# Patient Record
Sex: FEMALE | Race: WHITE | NOT HISPANIC OR LATINO | Employment: OTHER | URBAN - METROPOLITAN AREA
[De-identification: names, ages, dates, MRNs, and addresses within clinical notes are randomized per-mention and may not be internally consistent; named-entity substitution may affect disease eponyms.]

---

## 2017-07-31 ENCOUNTER — RECORDS - HEALTHEAST (OUTPATIENT)
Dept: BONE DENSITY | Facility: CLINIC | Age: 67
End: 2017-07-31

## 2017-07-31 ENCOUNTER — RECORDS - HEALTHEAST (OUTPATIENT)
Dept: ADMINISTRATIVE | Facility: OTHER | Age: 67
End: 2017-07-31

## 2017-07-31 DIAGNOSIS — Z78.0 ASYMPTOMATIC MENOPAUSAL STATE: ICD-10-CM

## 2017-09-20 ENCOUNTER — RECORDS - HEALTHEAST (OUTPATIENT)
Dept: ADMINISTRATIVE | Facility: OTHER | Age: 67
End: 2017-09-20

## 2017-10-18 ENCOUNTER — RECORDS - HEALTHEAST (OUTPATIENT)
Dept: ADMINISTRATIVE | Facility: OTHER | Age: 67
End: 2017-10-18

## 2017-11-16 ENCOUNTER — RECORDS - HEALTHEAST (OUTPATIENT)
Dept: ADMINISTRATIVE | Facility: OTHER | Age: 67
End: 2017-11-16

## 2017-12-05 ENCOUNTER — RECORDS - HEALTHEAST (OUTPATIENT)
Dept: ADMINISTRATIVE | Facility: OTHER | Age: 67
End: 2017-12-05

## 2018-01-02 ENCOUNTER — OFFICE VISIT - HEALTHEAST (OUTPATIENT)
Dept: INTERNAL MEDICINE | Facility: CLINIC | Age: 68
End: 2018-01-02

## 2018-01-02 DIAGNOSIS — E78.00 HYPERCHOLESTEREMIA: ICD-10-CM

## 2018-01-02 DIAGNOSIS — Z01.818 PREOP EXAM FOR INTERNAL MEDICINE: ICD-10-CM

## 2018-01-02 LAB
ALBUMIN SERPL-MCNC: 3.7 G/DL (ref 3.5–5)
ALP SERPL-CCNC: 71 U/L (ref 45–120)
ALT SERPL W P-5'-P-CCNC: 15 U/L (ref 0–45)
ANION GAP SERPL CALCULATED.3IONS-SCNC: 11 MMOL/L (ref 5–18)
AST SERPL W P-5'-P-CCNC: 17 U/L (ref 0–40)
BILIRUB DIRECT SERPL-MCNC: 0.2 MG/DL
BILIRUB SERPL-MCNC: 0.6 MG/DL (ref 0–1)
BUN SERPL-MCNC: 16 MG/DL (ref 8–22)
CALCIUM SERPL-MCNC: 9.4 MG/DL (ref 8.5–10.5)
CHLORIDE BLD-SCNC: 102 MMOL/L (ref 98–107)
CHOLEST SERPL-MCNC: 145 MG/DL
CO2 SERPL-SCNC: 23 MMOL/L (ref 22–31)
CREAT SERPL-MCNC: 0.76 MG/DL (ref 0.6–1.1)
ERYTHROCYTE [DISTWIDTH] IN BLOOD BY AUTOMATED COUNT: 11.1 % (ref 11–14.5)
FASTING STATUS PATIENT QL REPORTED: NO
GFR SERPL CREATININE-BSD FRML MDRD: >60 ML/MIN/1.73M2
GLUCOSE BLD-MCNC: 88 MG/DL (ref 70–125)
HCT VFR BLD AUTO: 40.1 % (ref 35–47)
HDLC SERPL-MCNC: 39 MG/DL
HGB BLD-MCNC: 13.5 G/DL (ref 12–16)
LDLC SERPL CALC-MCNC: 94 MG/DL
MCH RBC QN AUTO: 30.2 PG (ref 27–34)
MCHC RBC AUTO-ENTMCNC: 33.6 G/DL (ref 32–36)
MCV RBC AUTO: 90 FL (ref 80–100)
PLATELET # BLD AUTO: 301 THOU/UL (ref 140–440)
PMV BLD AUTO: 6.1 FL (ref 7–10)
POTASSIUM BLD-SCNC: 4 MMOL/L (ref 3.5–5)
PROT SERPL-MCNC: 7.5 G/DL (ref 6–8)
RBC # BLD AUTO: 4.47 MILL/UL (ref 3.8–5.4)
SODIUM SERPL-SCNC: 136 MMOL/L (ref 136–145)
TRIGL SERPL-MCNC: 58 MG/DL
WBC: 5.3 THOU/UL (ref 4–11)

## 2018-01-03 LAB
ATRIAL RATE - MUSE: 96 BPM
DIASTOLIC BLOOD PRESSURE - MUSE: NORMAL MMHG
INTERPRETATION ECG - MUSE: NORMAL
P AXIS - MUSE: 45 DEGREES
PR INTERVAL - MUSE: 154 MS
QRS DURATION - MUSE: 70 MS
QT - MUSE: 360 MS
QTC - MUSE: 454 MS
R AXIS - MUSE: -3 DEGREES
SYSTOLIC BLOOD PRESSURE - MUSE: NORMAL MMHG
T AXIS - MUSE: 61 DEGREES
VENTRICULAR RATE- MUSE: 96 BPM

## 2018-01-04 ASSESSMENT — MIFFLIN-ST. JEOR: SCORE: 1274.32

## 2018-01-08 ENCOUNTER — COMMUNICATION - HEALTHEAST (OUTPATIENT)
Dept: INTERNAL MEDICINE | Facility: CLINIC | Age: 68
End: 2018-01-08

## 2018-01-09 ASSESSMENT — MIFFLIN-ST. JEOR: SCORE: 1260.71

## 2018-01-12 ENCOUNTER — SURGERY - HEALTHEAST (OUTPATIENT)
Dept: SURGERY | Facility: CLINIC | Age: 68
End: 2018-01-12

## 2018-01-12 ENCOUNTER — ANESTHESIA - HEALTHEAST (OUTPATIENT)
Dept: SURGERY | Facility: CLINIC | Age: 68
End: 2018-01-12

## 2018-01-12 ASSESSMENT — MIFFLIN-ST. JEOR: SCORE: 1260.71

## 2018-01-17 ENCOUNTER — OFFICE VISIT - HEALTHEAST (OUTPATIENT)
Dept: PHYSICAL THERAPY | Facility: REHABILITATION | Age: 68
End: 2018-01-17

## 2018-01-17 DIAGNOSIS — R26.9 GAIT ABNORMALITY: ICD-10-CM

## 2018-01-17 DIAGNOSIS — M25.551 RIGHT HIP PAIN: ICD-10-CM

## 2018-01-17 DIAGNOSIS — R29.898 WEAKNESS OF RIGHT LOWER EXTREMITY: ICD-10-CM

## 2018-01-22 ENCOUNTER — OFFICE VISIT - HEALTHEAST (OUTPATIENT)
Dept: PHYSICAL THERAPY | Facility: REHABILITATION | Age: 68
End: 2018-01-22

## 2018-01-22 DIAGNOSIS — M25.551 RIGHT HIP PAIN: ICD-10-CM

## 2018-01-22 DIAGNOSIS — R26.9 GAIT ABNORMALITY: ICD-10-CM

## 2018-01-22 DIAGNOSIS — R29.898 WEAKNESS OF RIGHT LOWER EXTREMITY: ICD-10-CM

## 2018-01-24 ENCOUNTER — COMMUNICATION - HEALTHEAST (OUTPATIENT)
Dept: INTERNAL MEDICINE | Facility: CLINIC | Age: 68
End: 2018-01-24

## 2018-01-24 DIAGNOSIS — E78.00 HYPERCHOLESTEREMIA: ICD-10-CM

## 2018-01-25 ENCOUNTER — OFFICE VISIT - HEALTHEAST (OUTPATIENT)
Dept: PHYSICAL THERAPY | Facility: REHABILITATION | Age: 68
End: 2018-01-25

## 2018-01-25 DIAGNOSIS — M25.551 RIGHT HIP PAIN: ICD-10-CM

## 2018-01-25 DIAGNOSIS — R29.898 WEAKNESS OF RIGHT LOWER EXTREMITY: ICD-10-CM

## 2018-01-25 DIAGNOSIS — R26.9 GAIT ABNORMALITY: ICD-10-CM

## 2018-01-30 ENCOUNTER — RECORDS - HEALTHEAST (OUTPATIENT)
Dept: ADMINISTRATIVE | Facility: OTHER | Age: 68
End: 2018-01-30

## 2018-01-31 ENCOUNTER — OFFICE VISIT - HEALTHEAST (OUTPATIENT)
Dept: PHYSICAL THERAPY | Facility: REHABILITATION | Age: 68
End: 2018-01-31

## 2018-01-31 DIAGNOSIS — R29.898 WEAKNESS OF RIGHT LOWER EXTREMITY: ICD-10-CM

## 2018-01-31 DIAGNOSIS — M25.551 RIGHT HIP PAIN: ICD-10-CM

## 2018-01-31 DIAGNOSIS — R26.9 GAIT ABNORMALITY: ICD-10-CM

## 2018-02-20 ENCOUNTER — OFFICE VISIT - HEALTHEAST (OUTPATIENT)
Dept: PHYSICAL THERAPY | Facility: REHABILITATION | Age: 68
End: 2018-02-20

## 2018-02-20 DIAGNOSIS — R29.898 WEAKNESS OF RIGHT LOWER EXTREMITY: ICD-10-CM

## 2018-02-20 DIAGNOSIS — R26.9 GAIT ABNORMALITY: ICD-10-CM

## 2018-02-20 DIAGNOSIS — M25.551 RIGHT HIP PAIN: ICD-10-CM

## 2018-03-08 ENCOUNTER — RECORDS - HEALTHEAST (OUTPATIENT)
Dept: ADMINISTRATIVE | Facility: OTHER | Age: 68
End: 2018-03-08

## 2018-03-19 ENCOUNTER — RECORDS - HEALTHEAST (OUTPATIENT)
Dept: ADMINISTRATIVE | Facility: OTHER | Age: 68
End: 2018-03-19

## 2018-04-18 ENCOUNTER — HOSPITAL ENCOUNTER (OUTPATIENT)
Dept: MAMMOGRAPHY | Facility: CLINIC | Age: 68
Discharge: HOME | End: 2018-04-18
Attending: INTERNAL MEDICINE

## 2018-04-18 DIAGNOSIS — Z12.31 VISIT FOR SCREENING MAMMOGRAM: ICD-10-CM

## 2018-04-24 ENCOUNTER — HOSPITAL ENCOUNTER (OUTPATIENT)
Dept: MAMMOGRAPHY | Facility: CLINIC | Age: 68
Discharge: HOME | End: 2018-04-24
Attending: INTERNAL MEDICINE

## 2018-04-24 DIAGNOSIS — N64.89 BREAST ASYMMETRY: ICD-10-CM

## 2018-04-30 ENCOUNTER — COMMUNICATION - HEALTHEAST (OUTPATIENT)
Dept: INTERNAL MEDICINE | Facility: CLINIC | Age: 68
End: 2018-04-30

## 2018-06-13 ENCOUNTER — RECORDS - HEALTHEAST (OUTPATIENT)
Dept: ADMINISTRATIVE | Facility: OTHER | Age: 68
End: 2018-06-13

## 2019-02-12 ENCOUNTER — OFFICE VISIT - HEALTHEAST (OUTPATIENT)
Dept: FAMILY MEDICINE | Facility: CLINIC | Age: 69
End: 2019-02-12

## 2019-02-12 ENCOUNTER — HOSPITAL ENCOUNTER (OUTPATIENT)
Dept: LAB | Age: 69
Setting detail: SPECIMEN
Discharge: HOME | End: 2019-02-12

## 2019-02-12 DIAGNOSIS — N89.8 VAGINAL DISCHARGE: ICD-10-CM

## 2019-02-12 DIAGNOSIS — Z00.00 ROUTINE GENERAL MEDICAL EXAMINATION AT A HEALTH CARE FACILITY: ICD-10-CM

## 2019-02-12 LAB
ALBUMIN SERPL-MCNC: 4.2 G/DL (ref 3.5–5)
ALP SERPL-CCNC: 63 U/L (ref 45–120)
ALT SERPL W P-5'-P-CCNC: 16 U/L (ref 0–45)
ANION GAP SERPL CALCULATED.3IONS-SCNC: 11 MMOL/L (ref 5–18)
AST SERPL W P-5'-P-CCNC: 19 U/L (ref 0–40)
BILIRUB SERPL-MCNC: 0.7 MG/DL (ref 0–1)
BUN SERPL-MCNC: 11 MG/DL (ref 8–22)
CALCIUM SERPL-MCNC: 9.9 MG/DL (ref 8.5–10.5)
CHLORIDE BLD-SCNC: 107 MMOL/L (ref 98–107)
CHOLEST SERPL-MCNC: 167 MG/DL
CO2 SERPL-SCNC: 24 MMOL/L (ref 22–31)
CREAT SERPL-MCNC: 0.79 MG/DL (ref 0.6–1.1)
FASTING STATUS PATIENT QL REPORTED: YES
GFR SERPL CREATININE-BSD FRML MDRD: >60 ML/MIN/1.73M2
GLUCOSE BLD-MCNC: 97 MG/DL (ref 70–125)
HDLC SERPL-MCNC: 48 MG/DL
LDLC SERPL CALC-MCNC: 103 MG/DL
POTASSIUM BLD-SCNC: 4.2 MMOL/L (ref 3.5–5)
PROT SERPL-MCNC: 6.9 G/DL (ref 6–8)
SODIUM SERPL-SCNC: 142 MMOL/L (ref 136–145)
TRIGL SERPL-MCNC: 79 MG/DL

## 2019-02-12 ASSESSMENT — MIFFLIN-ST. JEOR: SCORE: 1284.52

## 2019-02-13 ENCOUNTER — COMMUNICATION - HEALTHEAST (OUTPATIENT)
Dept: FAMILY MEDICINE | Facility: CLINIC | Age: 69
End: 2019-02-13

## 2019-02-13 DIAGNOSIS — N89.8 VAGINAL DISCHARGE: ICD-10-CM

## 2019-02-13 DIAGNOSIS — Z85.42 HISTORY OF UTERINE CANCER: ICD-10-CM

## 2019-02-14 ENCOUNTER — RECORDS - HEALTHEAST (OUTPATIENT)
Dept: ADMINISTRATIVE | Facility: OTHER | Age: 69
End: 2019-02-14

## 2019-02-28 ENCOUNTER — COMMUNICATION - HEALTHEAST (OUTPATIENT)
Dept: FAMILY MEDICINE | Facility: CLINIC | Age: 69
End: 2019-02-28

## 2019-03-11 ENCOUNTER — OFFICE VISIT - HEALTHEAST (OUTPATIENT)
Dept: FAMILY MEDICINE | Facility: CLINIC | Age: 69
End: 2019-03-11

## 2019-03-11 DIAGNOSIS — R45.82 WORRIES: ICD-10-CM

## 2019-03-11 DIAGNOSIS — R03.0 ELEVATED BLOOD PRESSURE READING WITHOUT DIAGNOSIS OF HYPERTENSION: ICD-10-CM

## 2019-03-11 DIAGNOSIS — R42 DIZZINESS: ICD-10-CM

## 2019-03-11 LAB
BASOPHILS # BLD AUTO: 0 THOU/UL (ref 0–0.2)
BASOPHILS NFR BLD AUTO: 1 % (ref 0–2)
EOSINOPHIL # BLD AUTO: 0.2 THOU/UL (ref 0–0.4)
EOSINOPHIL NFR BLD AUTO: 3 % (ref 0–6)
ERYTHROCYTE [DISTWIDTH] IN BLOOD BY AUTOMATED COUNT: 11.8 % (ref 11–14.5)
HCT VFR BLD AUTO: 42.4 % (ref 35–47)
HGB BLD-MCNC: 14.3 G/DL (ref 12–16)
LYMPHOCYTES # BLD AUTO: 1.7 THOU/UL (ref 0.8–4.4)
LYMPHOCYTES NFR BLD AUTO: 31 % (ref 20–40)
MCH RBC QN AUTO: 33 PG (ref 27–34)
MCHC RBC AUTO-ENTMCNC: 33.8 G/DL (ref 32–36)
MCV RBC AUTO: 98 FL (ref 80–100)
MONOCYTES # BLD AUTO: 0.5 THOU/UL (ref 0–0.9)
MONOCYTES NFR BLD AUTO: 10 % (ref 2–10)
NEUTROPHILS # BLD AUTO: 3 THOU/UL (ref 2–7.7)
NEUTROPHILS NFR BLD AUTO: 56 % (ref 50–70)
PLATELET # BLD AUTO: 240 THOU/UL (ref 140–440)
PMV BLD AUTO: 7.2 FL (ref 7–10)
RBC # BLD AUTO: 4.34 MILL/UL (ref 3.8–5.4)
TSH SERPL DL<=0.005 MIU/L-ACNC: 1.7 UIU/ML (ref 0.3–5)
WBC: 5.4 THOU/UL (ref 4–11)

## 2019-04-27 ENCOUNTER — COMMUNICATION - HEALTHEAST (OUTPATIENT)
Dept: FAMILY MEDICINE | Facility: CLINIC | Age: 69
End: 2019-04-27

## 2019-04-27 ENCOUNTER — COMMUNICATION - HEALTHEAST (OUTPATIENT)
Dept: INTERNAL MEDICINE | Facility: CLINIC | Age: 69
End: 2019-04-27

## 2019-04-27 DIAGNOSIS — E78.00 HYPERCHOLESTEREMIA: ICD-10-CM

## 2019-05-01 ENCOUNTER — HOSPITAL ENCOUNTER (OUTPATIENT)
Dept: MAMMOGRAPHY | Facility: CLINIC | Age: 69
Discharge: HOME | End: 2019-05-01

## 2019-05-01 DIAGNOSIS — Z12.31 VISIT FOR SCREENING MAMMOGRAM: ICD-10-CM

## 2020-02-10 ENCOUNTER — OFFICE VISIT - HEALTHEAST (OUTPATIENT)
Dept: AUDIOLOGY | Facility: CLINIC | Age: 70
End: 2020-02-10

## 2020-02-10 ENCOUNTER — OFFICE VISIT - HEALTHEAST (OUTPATIENT)
Dept: OTOLARYNGOLOGY | Facility: CLINIC | Age: 70
End: 2020-02-10

## 2020-02-10 DIAGNOSIS — H90.42 SENSORINEURAL HEARING LOSS (SNHL) OF LEFT EAR WITH UNRESTRICTED HEARING OF RIGHT EAR: ICD-10-CM

## 2020-02-10 DIAGNOSIS — H81.10 BENIGN PAROXYSMAL POSITIONAL VERTIGO, UNSPECIFIED LATERALITY: ICD-10-CM

## 2020-02-10 DIAGNOSIS — H93.12 TINNITUS, LEFT: ICD-10-CM

## 2020-02-26 ENCOUNTER — OFFICE VISIT - HEALTHEAST (OUTPATIENT)
Dept: OCCUPATIONAL THERAPY | Facility: REHABILITATION | Age: 70
End: 2020-02-26

## 2020-02-26 DIAGNOSIS — R26.81 UNSTEADINESS ON FEET: ICD-10-CM

## 2020-02-26 DIAGNOSIS — H81.12 BENIGN PAROXYSMAL POSITIONAL VERTIGO OF LEFT EAR: ICD-10-CM

## 2020-02-26 DIAGNOSIS — Z78.9 DECREASED ACTIVITIES OF DAILY LIVING (ADL): ICD-10-CM

## 2020-03-11 ENCOUNTER — COMMUNICATION - HEALTHEAST (OUTPATIENT)
Dept: FAMILY MEDICINE | Facility: CLINIC | Age: 70
End: 2020-03-11

## 2020-03-11 DIAGNOSIS — E78.00 HYPERCHOLESTEREMIA: ICD-10-CM

## 2020-10-20 ENCOUNTER — AMBULATORY - HEALTHEAST (OUTPATIENT)
Dept: PULMONOLOGY | Facility: OTHER | Age: 70
End: 2020-10-20

## 2020-10-20 DIAGNOSIS — Z23 ENCOUNTER FOR IMMUNIZATION: ICD-10-CM

## 2021-02-12 ENCOUNTER — COMMUNICATION - HEALTHEAST (OUTPATIENT)
Dept: SCHEDULING | Facility: CLINIC | Age: 71
End: 2021-02-12

## 2021-02-14 ENCOUNTER — COMMUNICATION - HEALTHEAST (OUTPATIENT)
Dept: FAMILY MEDICINE | Facility: CLINIC | Age: 71
End: 2021-02-14

## 2021-02-14 DIAGNOSIS — E78.00 HYPERCHOLESTEREMIA: ICD-10-CM

## 2021-03-02 ENCOUNTER — OFFICE VISIT - HEALTHEAST (OUTPATIENT)
Dept: FAMILY MEDICINE | Facility: CLINIC | Age: 71
End: 2021-03-02

## 2021-03-02 DIAGNOSIS — Z78.0 POSTMENOPAUSAL STATUS: ICD-10-CM

## 2021-03-02 DIAGNOSIS — H90.42 SENSORINEURAL HEARING LOSS (SNHL) OF LEFT EAR WITH UNRESTRICTED HEARING OF RIGHT EAR: ICD-10-CM

## 2021-03-02 DIAGNOSIS — H93.12 TINNITUS, LEFT: ICD-10-CM

## 2021-03-02 DIAGNOSIS — E78.00 HYPERCHOLESTEREMIA: ICD-10-CM

## 2021-03-02 DIAGNOSIS — Z00.01 ENCOUNTER FOR GENERAL ADULT MEDICAL EXAMINATION WITH ABNORMAL FINDINGS: ICD-10-CM

## 2021-03-02 LAB
ALBUMIN SERPL-MCNC: 4.3 G/DL (ref 3.5–5)
ALP SERPL-CCNC: 63 U/L (ref 45–120)
ALT SERPL W P-5'-P-CCNC: 14 U/L (ref 0–45)
ANION GAP SERPL CALCULATED.3IONS-SCNC: 10 MMOL/L (ref 5–18)
AST SERPL W P-5'-P-CCNC: 19 U/L (ref 0–40)
BASOPHILS # BLD AUTO: 0.1 THOU/UL (ref 0–0.2)
BASOPHILS NFR BLD AUTO: 1 % (ref 0–2)
BILIRUB SERPL-MCNC: 0.7 MG/DL (ref 0–1)
BUN SERPL-MCNC: 12 MG/DL (ref 8–22)
CALCIUM SERPL-MCNC: 9.1 MG/DL (ref 8.5–10.5)
CHLORIDE BLD-SCNC: 107 MMOL/L (ref 98–107)
CHOLEST SERPL-MCNC: 143 MG/DL
CO2 SERPL-SCNC: 25 MMOL/L (ref 22–31)
CREAT SERPL-MCNC: 0.77 MG/DL (ref 0.6–1.1)
EOSINOPHIL # BLD AUTO: 0.3 THOU/UL (ref 0–0.4)
EOSINOPHIL NFR BLD AUTO: 7 % (ref 0–6)
ERYTHROCYTE [DISTWIDTH] IN BLOOD BY AUTOMATED COUNT: 12.1 % (ref 11–14.5)
FASTING STATUS PATIENT QL REPORTED: YES
GFR SERPL CREATININE-BSD FRML MDRD: >60 ML/MIN/1.73M2
GLUCOSE BLD-MCNC: 94 MG/DL (ref 70–125)
HCT VFR BLD AUTO: 39.4 % (ref 35–47)
HDLC SERPL-MCNC: 45 MG/DL
HGB BLD-MCNC: 13.5 G/DL (ref 12–16)
IMM GRANULOCYTES # BLD: 0 THOU/UL
IMM GRANULOCYTES NFR BLD: 0 %
LDLC SERPL CALC-MCNC: 87 MG/DL
LYMPHOCYTES # BLD AUTO: 1 THOU/UL (ref 0.8–4.4)
LYMPHOCYTES NFR BLD AUTO: 23 % (ref 20–40)
MCH RBC QN AUTO: 32.6 PG (ref 27–34)
MCHC RBC AUTO-ENTMCNC: 34.3 G/DL (ref 32–36)
MCV RBC AUTO: 95 FL (ref 80–100)
MONOCYTES # BLD AUTO: 0.5 THOU/UL (ref 0–0.9)
MONOCYTES NFR BLD AUTO: 11 % (ref 2–10)
NEUTROPHILS # BLD AUTO: 2.6 THOU/UL (ref 2–7.7)
NEUTROPHILS NFR BLD AUTO: 58 % (ref 50–70)
PLATELET # BLD AUTO: 223 THOU/UL (ref 140–440)
PMV BLD AUTO: 9.1 FL (ref 7–10)
POTASSIUM BLD-SCNC: 3.9 MMOL/L (ref 3.5–5)
PROT SERPL-MCNC: 6.7 G/DL (ref 6–8)
RBC # BLD AUTO: 4.14 MILL/UL (ref 3.8–5.4)
SODIUM SERPL-SCNC: 142 MMOL/L (ref 136–145)
TRIGL SERPL-MCNC: 54 MG/DL
WBC: 4.5 THOU/UL (ref 4–11)

## 2021-03-02 RX ORDER — ATORVASTATIN CALCIUM 10 MG/1
TABLET, FILM COATED ORAL
Qty: 90 TABLET | Refills: 3 | Status: SHIPPED | OUTPATIENT
Start: 2021-03-02 | End: 2022-03-21

## 2021-03-02 ASSESSMENT — ANXIETY QUESTIONNAIRES
5. BEING SO RESTLESS THAT IT IS HARD TO SIT STILL: NOT AT ALL
IF YOU CHECKED OFF ANY PROBLEMS ON THIS QUESTIONNAIRE, HOW DIFFICULT HAVE THESE PROBLEMS MADE IT FOR YOU TO DO YOUR WORK, TAKE CARE OF THINGS AT HOME, OR GET ALONG WITH OTHER PEOPLE: NOT DIFFICULT AT ALL
1. FEELING NERVOUS, ANXIOUS, OR ON EDGE: SEVERAL DAYS
2. NOT BEING ABLE TO STOP OR CONTROL WORRYING: SEVERAL DAYS
4. TROUBLE RELAXING: SEVERAL DAYS
GAD7 TOTAL SCORE: 5
6. BECOMING EASILY ANNOYED OR IRRITABLE: SEVERAL DAYS
7. FEELING AFRAID AS IF SOMETHING AWFUL MIGHT HAPPEN: NOT AT ALL
3. WORRYING TOO MUCH ABOUT DIFFERENT THINGS: SEVERAL DAYS

## 2021-03-02 ASSESSMENT — PATIENT HEALTH QUESTIONNAIRE - PHQ9: SUM OF ALL RESPONSES TO PHQ QUESTIONS 1-9: 0

## 2021-03-02 ASSESSMENT — MIFFLIN-ST. JEOR: SCORE: 1235.53

## 2021-03-03 LAB — HCV AB SERPL QL IA: NEGATIVE

## 2021-03-12 ENCOUNTER — HOSPITAL ENCOUNTER (OUTPATIENT)
Dept: MAMMOGRAPHY | Facility: CLINIC | Age: 71
Discharge: HOME | End: 2021-03-12

## 2021-03-12 DIAGNOSIS — Z12.31 SCREENING MAMMOGRAM, ENCOUNTER FOR: ICD-10-CM

## 2021-04-06 ENCOUNTER — COMMUNICATION - HEALTHEAST (OUTPATIENT)
Dept: FAMILY MEDICINE | Facility: CLINIC | Age: 71
End: 2021-04-06

## 2021-04-06 DIAGNOSIS — R42 VERTIGO: ICD-10-CM

## 2021-04-13 ENCOUNTER — OFFICE VISIT - HEALTHEAST (OUTPATIENT)
Dept: OCCUPATIONAL THERAPY | Facility: REHABILITATION | Age: 71
End: 2021-04-13

## 2021-04-13 DIAGNOSIS — R26.81 UNSTEADINESS ON FEET: ICD-10-CM

## 2021-04-13 DIAGNOSIS — Z78.9 DECREASED ACTIVITIES OF DAILY LIVING (ADL): ICD-10-CM

## 2021-04-13 DIAGNOSIS — H81.11 BENIGN PAROXYSMAL POSITIONAL VERTIGO, RIGHT: ICD-10-CM

## 2021-04-21 ENCOUNTER — OFFICE VISIT - HEALTHEAST (OUTPATIENT)
Dept: OCCUPATIONAL THERAPY | Facility: REHABILITATION | Age: 71
End: 2021-04-21

## 2021-04-21 DIAGNOSIS — H81.11 BENIGN PAROXYSMAL POSITIONAL VERTIGO, RIGHT: ICD-10-CM

## 2021-05-01 ENCOUNTER — HEALTH MAINTENANCE LETTER (OUTPATIENT)
Age: 71
End: 2021-05-01

## 2021-05-20 ENCOUNTER — RECORDS - HEALTHEAST (OUTPATIENT)
Dept: ADMINISTRATIVE | Facility: OTHER | Age: 71
End: 2021-05-20

## 2021-05-20 ENCOUNTER — RECORDS - HEALTHEAST (OUTPATIENT)
Dept: FAMILY MEDICINE | Facility: CLINIC | Age: 71
End: 2021-05-20

## 2021-05-20 DIAGNOSIS — R42 VERTIGO: ICD-10-CM

## 2021-05-24 ENCOUNTER — OFFICE VISIT - HEALTHEAST (OUTPATIENT)
Dept: OCCUPATIONAL THERAPY | Facility: REHABILITATION | Age: 71
End: 2021-05-24

## 2021-05-24 DIAGNOSIS — R26.81 UNSTEADINESS ON FEET: ICD-10-CM

## 2021-05-24 DIAGNOSIS — Z78.9 DECREASED ACTIVITIES OF DAILY LIVING (ADL): ICD-10-CM

## 2021-05-24 DIAGNOSIS — H81.12 BENIGN PAROXYSMAL POSITIONAL VERTIGO OF LEFT EAR: ICD-10-CM

## 2021-05-24 DIAGNOSIS — H81.11 BENIGN PAROXYSMAL POSITIONAL VERTIGO, RIGHT: ICD-10-CM

## 2021-05-24 DIAGNOSIS — R42 DIZZINESS: ICD-10-CM

## 2021-05-27 ASSESSMENT — PATIENT HEALTH QUESTIONNAIRE - PHQ9: SUM OF ALL RESPONSES TO PHQ QUESTIONS 1-9: 0

## 2021-05-28 ENCOUNTER — RECORDS - HEALTHEAST (OUTPATIENT)
Dept: ADMINISTRATIVE | Facility: CLINIC | Age: 71
End: 2021-05-28

## 2021-05-28 ASSESSMENT — ANXIETY QUESTIONNAIRES: GAD7 TOTAL SCORE: 5

## 2021-05-28 NOTE — TELEPHONE ENCOUNTER
Refill Approved    Rx renewed per Medication Renewal Policy. Medication was last renewed on 1/27/18, last OV 3/11/19.    Orquidea Kramer, [facility] Triage/Med Refill 4/27/2019     Requested Prescriptions   Pending Prescriptions Disp Refills     atorvastatin (LIPITOR) 10 MG tablet [Pharmacy Med Name: ATORVASTATIN 10MG TABLETS] 90 tablet 0     Sig: TAKE 1 TABLET(10 MG) BY MOUTH AT BEDTIME       Statins Refill Protocol (Hmg CoA Reductase Inhibitors) Passed - 4/27/2019  8:46 AM        Passed - PCP or prescribing provider visit in past 12 months      Last office visit with prescriber/PCP: Visit date not found OR same dept: Visit date not found OR same specialty: Visit date not found  Last physical: 1/2/2018 Last MTM visit: Visit date not found   Next visit within 3 mo: Visit date not found  Next physical within 3 mo: Visit date not found  Prescriber OR PCP: Zoila Olea MD  Last diagnosis associated with med order: There are no diagnoses linked to this encounter.  If protocol passes may refill for 12 months if within 3 months of last provider visit (or a total of 15 months).

## 2021-05-31 VITALS — WEIGHT: 165.8 LBS | BODY MASS INDEX: 27.59 KG/M2

## 2021-05-31 VITALS — BODY MASS INDEX: 26.99 KG/M2 | WEIGHT: 162 LBS | HEIGHT: 65 IN

## 2021-06-02 VITALS — HEIGHT: 65 IN | WEIGHT: 169 LBS | BODY MASS INDEX: 28.16 KG/M2

## 2021-06-02 VITALS — WEIGHT: 169.6 LBS | BODY MASS INDEX: 28.66 KG/M2

## 2021-06-05 VITALS
HEART RATE: 71 BPM | DIASTOLIC BLOOD PRESSURE: 67 MMHG | BODY MASS INDEX: 26.36 KG/M2 | WEIGHT: 158.2 LBS | SYSTOLIC BLOOD PRESSURE: 132 MMHG | OXYGEN SATURATION: 99 % | HEIGHT: 65 IN

## 2021-06-05 NOTE — PROGRESS NOTES
HISTORY OF PRESENT ILLNESS  Asked to see by Dr. Bhandari for evaluation of dizziness. Patient reports that she is experiencing some white noise over the last few months. Had a physical last February and didn't notice it then. The noise is primarily in the left ear. The dizzines occurs with lifting hands over head. She feels like she spins. If she is in her bed and turns to the left she gets dizzy. When she gets up she can be a little woozy. She functions normally during the day. No pain. No hearing fluctuation. No fullness or pressure in the ear. No history of significant noise exposure. She reports that she was having the dizziness last year and it disappeared. It seem to have come back in the Winter.     REVIEW OF SYSTEMS  Review of Systems: a 10-system review was performed. Pertinent positives are noted in the HPI and on a separate scanned document in the chart.    PMH, PSH, FH and SH has documented in the EHR.      EXAM    CONSTITUTIONAL  General Appearance:  Normal, well developed, well nourished, no obvious distress  Ability to Communicate:  communicates appropriately.    HEAD AND FACE  Appearance and Symmetry:  Normal, no scalp or facial scarring or suspicious lesions.  Paranasal sinuses tenderness:  Normal, Paranasal sinuses non tender    EARS  Clinical speech reception threshold:  Normal, able to hear normal speech.  Auricle:  Normal, Auricles without scars, lesions, masses.  External auditory canal:  Normal, External auditory canal normal.  Tympanic membrane:  Normal, Tympanic membranes normal without swelling or erythema.  Tympanic membrane mobility:  Normal, Normal tympanic membrane mobility.    NOSE (speculum or scope)  Architecture:  Normal, Grossly normal external nasal architecture with no masses or lesions.  Mucosa:  Normal mucosa, No polyps or masses.  Septum:  Normal, Septum non-obstructing.  Turbinates:  Normal, No turbinate abnormalities    ORAL CAVITY AND OROPHARYNX  Lips:  Normal.  Dental and  gingiva:  Normal, No obvious dental or gingival disease.  Mucosa:  Normal, Moist mucous membranes.  Tongue:  Normal, Tongue mobile with no mucosal abnormalities  Hard and soft palate:  Normal, Hard and soft palate without cleft or mucosal lesions.  Oral pharynx:  Normal, Posterior pharynx without lesions or remarkable asymmetry.  Saliva:  Normal, Clear saliva.  Masses:  Normal, No palpable masses or pathologically enlarged lymph nodes.    NECK  Masses/lymph nodes:  Normal, No worrisome neck masses or lymph nodes.  Salivary glands:  Normal, Parotid and submandibular glands.  Trachea and larynx position:  Normal, Trachea and larynx midline.  Thyroid:  Normal, No thyroid abnormality.  Tenderness:  Normal, No cervical tenderness.  Suppleness:  Normal, Neck supple    NEUROLOGICAL  Speech pattern:  Normal, Proasaic    RESPIRATORY  Symmetry and Respiratory effort:  Normal, Symmetric chest movement and expansion with no increased intercostal retractions or use of accessory muscles.     HEARING TEST  Results of hearing test as documented in audiology notes which were reviewed.    IMPRESSION  1. Tinnitus is likely related to the very mild high tone loss, left worse than the right.  2. Benign paroxysmal positional vertigo    RECOMMENDATION  Will refer to Karmen Ferguson at [facility] for treatment of BPPV.     Jimmy Torres MD

## 2021-06-06 NOTE — PROGRESS NOTES
Discharge Summary  Patient Name: Brianna Wesley  Date: 10/21/2020  Referral Diagnosis: vertigo  Referring provider: Jimmy Torres MD  Visit Diagnosis:   1. Benign paroxysmal positional vertigo of left ear     2. Unsteadiness on feet     3. Decreased activities of daily living (ADL)         Goal status: goals met    Patient was seen for 1 visit. The patient will need a new referral to resume.    Thank you for your referral.  Deborah Caldwell  10/21/2020  8:34 PM        Rehabilitation Certification Request    February 26, 2020      Patient: Brianna Wesley  MR Number: [id number]  YOB: 1950  Date of Visit: 2/26/2020      Dear Dr. Jimmy Torres:    Thank you for this referral.   We are seeing Brianna Wesley in Occupational Therapy for vertigo.    Medicare and/or Medicaid requires physician review and approval of the treatment plan. Please review the plan of care and verify that you agree with the therapy plan of care by co-signing this note.      Plan of Care  Authorization / Certification Start Date: 02/26/20  Authorization / Certification End Date: 05/26/20  Authorization / Certification Number of Visits: 12  Communication with: Referral Source  Patient Related Instruction: Nature of Condition;Treatment plan and rationale;Basis of treatment;Expected outcome  Times per Week: 1  Number of Weeks: 12  Number of Visits: 12  Neuromuscular Reeducation: vestibular  Canolith Repostioning:        Goals:  Patient will bend: to dress;to clean;without vertigo;without loss of balance;in 12 weeks  Patient able to perform bed mobility: without vertigo;in 12 weeks  Patient will turn head: without dizziness;for conversation;in 12 weeks  Patient will look up / down: without vertigo;for drinking;in 12 weeks        If you have any questions or concerns, please don't hesitate to call.    Sincerely,      Deborah Caldwell, OT        Physician recommendation:                                ___ Follow therapist's  recommendation                                                                                                    ___ Modify therapy                                                                                                      Physician Signature:_____________________                                                                                                                                        Date:___________________________    *Physician co-signature indicates they certify the need for these services furnished within this plan and while under their care.         Vestibular Initial Evaluation    Patient Name: Brianna Wesley  Date of evaluation: 2/26/2020  Referral Diagnosis: BPPV  Referring provider: Jimmy Torres MD  Visit Diagnosis:     ICD-10-CM    1. Benign paroxysmal positional vertigo of left ear H81.12    2. Unsteadiness on feet R26.81    3. Decreased activities of daily living (ADL) Z78.9        Assessment:      Patient has positive left Anju - Hallpike and was treated with left Epley maneuver.    Goals:  Patient will bend: to dress;to clean;without vertigo;without loss of balance;in 12 weeks  Patient able to perform bed mobility: without vertigo;in 12 weeks  Patient will turn head: without dizziness;for conversation;in 12 weeks  Patient will look up / down: without vertigo;for drinking;in 12 weeks    Patient's expectations/goals are realistic.    Barriers to Learning or Achieving Goals:  No Barriers.       Plan / Patient Instructions:        Plan of Care:   Authorization / Certification Start Date: 02/26/20  Authorization / Certification End Date: 05/26/20  Authorization / Certification Number of Visits: 12  Communication with: Referral Source  Patient Related Instruction: Nature of Condition;Treatment plan and rationale;Basis of treatment;Expected outcome  Times per Week: 1  Number of Weeks: 12  Number of Visits: 12  Neuromuscular Reeducation: vestibular  Canolith Repostioning:            Subjective:         History of Present Illness:    Brianna is a 69 y.o. female who presents to therapy today with complaints of vertigo and usnteadiness. Patient had sudden onset of symptoms in 2018. Symptoms resolved after 6 months but recurred in the fall of . She denies history of similar symptoms. Patient saw ENT in 2020. Patient denies ear pain. Patient has mild hearing changes in the low tones in her left ear per audiology. Functional limitations are described as occurring with balance, bending, bed mobility, head turns, looking up or down, stepping over curbs.    Pain Ratin         Objective:      Note: Items left blank indicates the item was not performed or not indicated at the time of the evaluation.    Patient Outcome Measures:   Dizziness Handicap Inventory  Score in %: 28       Vestibular Disorder Examination  1. Benign paroxysmal positional vertigo of left ear     2. Unsteadiness on feet     3. Decreased activities of daily living (ADL)         Precautions/Restrictions: None    Posture Observation: General standing posture is normal.    ROM:  Not Tested    Strength: Not Tested    Sensation: NT    Functional Mobility: good      Modified CTSIB:  Normal Surface Eyes Open-  Normal Surface Eyes Closed-  Perturbed Surface Eyes Open-  Perturbed Surface Eyes Closed-    The remainder of the tests are performed using video frenzel goggles / in room light.    Oculomotor Assessment:   Spontaneous Nystagmus with fixation:   Spontaneous Nystagmus without fixation:  Gaze-evoked nystagmus:  Smooth pursuit:  Saccades:  VOR to slow movement:   VOR Head Thrust:  VOR Cancellation:  OPK's: NT  Static Visual Acuity:  Dynamic Visual Acuity:    Positional Tests:  Hallpike Right:  NT  Hallpike Left:  Abnormal - Nystagmus left torsional, up beating, 2 second latency and fatigues in 15 seconds  Head Roll Right: NT  Head Roll Left:  NT    Plan for next visit: check right Hallpike first and repeat Epley on  left if indicated    Treatment Today: Treated with left Epley maneuver today. Patient was very nauseated and unable to tolerate a second maneuver today. She will return if symptoms persist.  TREATMENT MINUTES COMMENTS   Evaluation 15    Self-care/ Home management     Neuromuscular Re-education     Canalith repositioning procedure 10          Total 25    Blank areas are intentional and mean the treatment did not include these items.     GOALS AND PLAN OF CARE WERE ESTABLISHED IN COOPERATION WITH THE PATIENT    OT Evaluation Code: (Please list factors)   Comorbidities:   Patient Active Problem List   Diagnosis     Osteopenia     Hypercholesteremia     Hip osteoarthritis       Profile/History Review: Brief    Need for eval modification: No    # Treatment options: Limited    Clinical Decision Making:  Low      Occupational Profile/ Medical and Therapy History and Comorbidities Occupational Performance Clinical Decision Making   (Complexity)   brief history with review of medical/therapy records related to the presenting problem.  No comorbidities 1-3 Performance deficits that result in activity limitations and/or participation restrictions.    No Assessment Modification  Low complexity, which includes  problem-focused assessments, and consideration of a limited number of treatment options.      expanded review of medical/therapy records and additional review of physical, cognitive and psychosocial history.    May have comorbidities 3-5 Performance deficits that result in activity limitations and/or participation restrictions.    Minimal to moderate modification of assessment Moderate complexity, which includes analysis of data from detailed assessments, and consideration of several treatment options.         Review of medical/therapy records and extensive additional review of physical, cognitive and psychosocial history.  Comorbidities affect occupational performance 5 or more Performance deficits that result in  activity limitations and/or participation restrictions.    Significant modification of assessment High complexity, analysis of  Occupational profile and data,  Comprehensive assessments, multiple treatment options.            Deborah Caldwell  2/26/2020  6:59 AM

## 2021-06-15 NOTE — PROGRESS NOTES
[facility] Daily Progress     Patient Name: Brianna Wesley  Date: 1/31/2018  Visit #: 4/12 - Medicare  PTA visit #:    Referral Diagnosis: s/p R hip JONATHAN  Referring provider: Zeenat Flores PA-C Dr. Kristoffer Breien   Visit Diagnosis:     ICD-10-CM    1. Right hip pain M25.551    2. Weakness of right lower extremity R29.898    3. Gait abnormality R26.9          Assessme   Patient's hip strength continues to improve. No change in mm activation in R foot/ankle DF. Pt does however feel increased pain and numbness/tingling which is likely due to nerve regeneration.     Patient is benefitting from skilled physical therapy and is making steady progress toward functional goals.  Patient is appropriate to continue with skilled physical therapy intervention, as indicated by initial plan of care.    Goal Status:  Pt. will be independent with home exercise program in : 2 weeks  Pt will: ambulate without AD without increased hip pain for >10 minutes for exercise in 12 weeks  Pt will: ascend/descend 12 stairs reciprocally with 1 HR without increased pain in 12 weeks  Pt will: transfer sit<>supine without UE assistance for R LE in 12 weeks  Pt will: perform >30 minutes yardwork/housework without AD without increased hip pain in 12 weeks    Plan / Patient Education:     Continue with initial plan of care.  Progress with home program as tolerated.    Subjective:   Patient saw MD yesterday. No changes thus far in muscle strength/activation.   Feels increased numbness/tingling in her foot, which is keeping her from sleeping.     Objective:     Presents with SPC, AFO on R    Treatment Today     TREATMENT MINUTES COMMENTS   Evaluation     Self-care/ Home management     Manual therapy 10 Posterior glide R foot grade III with oscillations at TC joint   Posterior glide to distal R tib fib joint grade III   Oscillations at proximal fibula A/P grade III   Neuromuscular Re-education     Therapeutic Activity     Therapeutic  Exercises 18 Upright bike WL 2, 5:00     Exercises reviewed and progressed:     Exercise #2: SLR supine and sidelying  Comment #2: HEP x 10 reps with quad set - using belt/sheet   Exercise #8: Clamshell HEP no resistance x 15-20 reps   Exercise #9: Glut raises  Comment #9: HEP x 10-15 reps x 2-3 sets       Gait training     Modality__________________                Total 28    Blank areas are intentional and mean the treatment did not include these items.       Radha Hearn  1/31/2018

## 2021-06-15 NOTE — PROGRESS NOTES
Assessment and Plan:     1. Encounter for general adult medical examination with abnormal findings  Comprehensive Metabolic Panel    Lipid Cascade FASTING    Hepatitis C Antibody (Anti-HCV)    HM1(CBC and Differential)   2. Hypercholesteremia  atorvastatin (LIPITOR) 10 MG tablet   3. Postmenopausal status  DXA Bone Density Scan   4. Sensorineural hearing loss (SNHL) of left ear with unrestricted hearing of right ear     5. Tinnitus, left       Medical decision makin-year-old female with the hyperlipidemia, tinnitus that is stable presents today for annual wellness visit.  Reviewed audiology visit and ENT visit.  Patient does have sensorineural hearing loss of the left ear.  Health maintenance labs as above.  DEXA scan ordered.  Mini cog addressed with the patient as. Mom and MGM had dementia.  For hyperlipidemia, check labs as above and continue current medication.  May discontinue aspirin as no benefit in primary prevention and new guidelines discussed.    Patient has been advised of split billing requirements and indicates understanding: Yes      The patient's current medical problems were reviewed.    I have had an Advance Directives discussion with the patient.  The following health maintenance schedule was reviewed with the patient and provided in printed form in the after visit summary:   Health Maintenance   Topic Date Due     HEPATITIS C SCREENING  1950     ZOSTER VACCINES (1 of 2) 2001     MAMMOGRAM  2021     MEDICARE ANNUAL WELLNESS VISIT  2022     FALL RISK ASSESSMENT  2022     LIPID  2024     COLORECTAL CANCER SCREENING  2025     ADVANCE CARE PLANNING  2026     TD 18+ HE  2028     DEXA SCAN  2032     Pneumococcal Vaccine: 65+ Years  Completed     INFLUENZA VACCINE RULE BASED  Completed     Pneumococcal Vaccine: Pediatrics (0 to 5 Years) and At-Risk Patients (6 to 64 Years)  Aged Out     HEPATITIS B VACCINES  Aged Out        Subjective:      Chief Complaint   Patient presents with     Annual Wellness Visit     Pt is fasting today, refill needed, no questions or concerns. Pt has mammogram coming up on the , doesn't need a breast exam today       Chief Complaint: Brianna Wesley is an 70 y.o. female here for an Annual Wellness visit.   HPI:  Dad passed away 2020.  Watching diet and exercising on treadmill   has multiple medical issues including COPD, heart issues with pacemaker etc.  She is  primary caregiver.  She continues to have tinnitus.  She has followed with ENT.  Her vertigo has resolved after physical therapy.  Blood pressure was initially elevated in the clinic.  She checks at home and this is fairly normal.  She denies any shortness of breath, fatigue, palpitations    Review of Systems:    Please see above.  The rest of the review of systems are negative for all systems.    Patient Care Team:  Bisi Matute MD as PCP - General (Family Medicine)  Bisi Matute MD as Assigned PCP  Jimmy Torres MD as Assigned Surgical Provider     Patient Active Problem List   Diagnosis     Osteopenia     Hypercholesteremia     Hip osteoarthritis     Sensorineural hearing loss (SNHL) of left ear with unrestricted hearing of right ear     Tinnitus, left     Past Medical History:   Diagnosis Date     Hypercholesteremia      Osteopenia      Uterine carcinoma (H)     in situ      Past Surgical History:   Procedure Laterality Date     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, ovaries remain     AK TOTAL HIP ARTHROPLASTY Right 2018    Procedure: RIGHT TOTAL HIP ARTHROPLASTY;  Surgeon: Victor Hugo Cantrell MD;  Location: [facility];  Service: Orthopedics      Family History   Problem Relation Age of Onset     Dementia Mother 85         age 88 with bedsore     Heart disease Father         alive in his 90s     Breast cancer Neg Hx       Social History     Socioeconomic History     Marital status:      Spouse  name: Not on file     Number of children: Not on file     Years of education: Not on file     Highest education level: Not on file   Occupational History     Not on file   Social Needs     Financial resource strain: Not on file     Food insecurity     Worry: Not on file     Inability: Not on file     Transportation needs     Medical: Not on file     Non-medical: Not on file   Tobacco Use     Smoking status: Never Smoker     Smokeless tobacco: Never Used   Substance and Sexual Activity     Alcohol use: Yes     Drug use: No     Sexual activity: Not on file   Lifestyle     Physical activity     Days per week: Not on file     Minutes per session: Not on file     Stress: Not on file   Relationships     Social connections     Talks on phone: Not on file     Gets together: Not on file     Attends Yazdanism service: Not on file     Active member of club or organization: Not on file     Attends meetings of clubs or organizations: Not on file     Relationship status: Not on file     Intimate partner violence     Fear of current or ex partner: Not on file     Emotionally abused: Not on file     Physically abused: Not on file     Forced sexual activity: Not on file   Other Topics Concern     Not on file   Social History Narrative    She is  and has one daughter and one graddaughter.  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  She occasionally drinks alcohol.  She does not smoke cigarettes and tries to go to the gym on a regular basis.        The 10-year ASCVD risk score (Tina HARMAN Jr., et al., 2013) is: 9.7%      Values used to calculate the score:        Age: 68 years        Sex: Female        Is Non- : No        Diabetic: No        Tobacco smoker: No        Systolic Blood Pressure: 156 mmHg        Is BP treated: No        HDL Cholesterol: 39 mg/dL        Total Cholesterol: 145 mg/dL      Current Outpatient Medications   Medication Sig Dispense Refill  "    cholecalciferol, vitamin D3, 1,000 unit tablet Take 2,000 Units by mouth daily.       atorvastatin (LIPITOR) 10 MG tablet TAKE 1 TABLET(10 MG) BY MOUTH AT BEDTIME 90 tablet 3     No current facility-administered medications for this visit.       Objective:   Vital Signs:   Visit Vitals  /67   Pulse 71   Ht 5' 4.5\" (1.638 m)   Wt 158 lb 3.2 oz (71.8 kg)   SpO2 99%   BMI 26.74 kg/m           VisionScreening:  No exam data present     PHYSICAL EXAM  GENERAL: Healthy, alert and no distress  EYES: Eyes grossly normal to inspection. No discharge or erythema, or obvious scleral/conjunctival abnormalities.  NECK: no adenopathy, thyroid normal to palpation, trachea midline and normal to palpation and no carotid bruits  RESP: lungs clear to auscultation - no rales, rhonchi or wheezes  CV: regular rates and rhythm  NEURO: Cranial nerves grossly intact. Mentation and speech appropriate for age.  PSYCH: Mentation appears normal, affect normal/bright, judgement and insight intact, normal speech and appearance well-groomed      Assessment Results 3/2/2021   Activities of Daily Living No help needed   Instrumental Activities of Daily Living No help needed   Mini Cog Total Score 4   Some recent data might be hidden     A Mini-Cog score of 0-2 suggests the possibility of dementia, score of 3-5 suggests no dementia    Identified Health Risks:     Patient's advanced directive was discussed and I am comfortable with the patient's wishes.        "

## 2021-06-15 NOTE — PROGRESS NOTES
"[facility] Daily Progress     Patient Name: Brianna Wesley  Date: 1/22/2018  Visit #: 2/12 - Medicare  PTA visit #:    Referral Diagnosis: s/p R hip JONATHAN  Referring provider: Zoila Olea MD Dr. Kristoffer Breien   Visit Diagnosis:     ICD-10-CM    1. Right hip pain M25.551    2. Weakness of right lower extremity R29.898    3. Gait abnormality R26.9          Assessment:   Patient reports improving LE strength on R and slight increase tingling in R foot. Explained to pt this is likely due to nerve regeneration. Pt was able to perform SLR today independently which she was previously unable to perform.     Patient is benefitting from skilled physical therapy and is making steady progress toward functional goals.  Patient is appropriate to continue with skilled physical therapy intervention, as indicated by initial plan of care.    Goal Status:  Pt. will be independent with home exercise program in : 2 weeks  Pt will: ambulate without AD without increased hip pain for >10 minutes for exercise in 12 weeks  Pt will: ascend/descend 12 stairs reciprocally with 1 HR without increased pain in 12 weeks  Pt will: transfer sit<>supine without UE assistance for R LE in 12 weeks  Pt will: perform >30 minutes yardwork/housework without AD without increased hip pain in 12 weeks    Plan / Patient Education:     Continue with initial plan of care.  Progress with home program as tolerated.    Subjective:   Patient returns to PT reporting improved strength in R LE. \"I can't believe how much the exercises help!\".   Returns to MD January 30th for FU.     Objective:     Presents with walker, AFO on R    Treatment Today     TREATMENT MINUTES COMMENTS   Evaluation     Self-care/ Home management     Manual therapy 9 Posterior glide R foot grade III with oscillations at TC joint   Posterior glide to distal R tib fib joint grade III    Neuromuscular Re-education     Therapeutic Activity     Therapeutic Exercises 20 Upright " bike WL 1, 5:00     Exercises reviewed and progressed:   Exercise #1: Continue from previous HEP: 2x/day  Comment #1: heel slides, SAQ, quad set  Exercise #2: SLR   Comment #2: HEP x 10 reps with quad set - using belt/sheet   Exercise #3: Step up R LE leading  Comment #3: In clinc only with B UE support x 10 reps   Exercise #4: Ab set  Comment #4: HEP advanced with marching x 20 reps  Exercise #5: Bridges  Comment #5: HEP x 3 seconds x 10-15 reps   Exercise #6: Seated sciatic slump slider  Comment #6: HEP x 10 reps x 5 x/day  Exercise #7: Seated gastroc stretch  Comment #7: HEP x 30 seconds x 2-3 reps     Discussed bringing in SPC to next visit for gait training.    Gait training     Modality__________________                Total 29    Blank areas are intentional and mean the treatment did not include these items.       Radha Hearn  1/22/2018

## 2021-06-15 NOTE — PROGRESS NOTES
[facility] Certification Request    January 17, 2018      Patient: Brianna Wesley  MR Number: [id number]  YOB: 1950  Date of Visit: 1/17/2018      Dear Dr. Victor Hugo Cantrell,    Thank you for this referral.   We are seeing Brianna Wesley for Physical Therapy for R hip pain s/p JONATHAN.    Medicare and/or Medicaid requires physician review and approval of the treatment plan. Please review the plan of care and verify that you agree with the therapy plan of care by co-signing this note.      Plan of Care  Authorization / Certification Start Date: 01/17/18  Authorization / Certification End Date: 04/17/18  Authorization / Certification Number of Visits: up to 12 visits   Communication with: Referral Source  Patient Related Instruction: Nature of Condition;Treatment plan and rationale;Self Care instruction;Basis of treatment  Times per Week: 1-2x/week  Number of Weeks: up to 12 weeks  Number of Visits: up to 12 visits   Therapeutic Exercise: ROM;Stretching;Strengthening  Neuromuscular Reeducation: kinesio tape;posture;balance/proprioception;TNE  Manual Therapy: soft tissue mobilization;myofascial release;joint mobilization;muscle energy  Other Plan #2: therapeutic activity     Goals:  Pt. will be independent with home exercise program in : 2 weeks  Pt will: ambulate without AD without increased hip pain for >10 minutes for exercise in 12 weeks  Pt will: ascend/descend 12 stairs reciprocally with 1 HR without increased pain in 12 weeks  Pt will: transfer sit<>supine without UE assistance for R LE in 12 weeks  Pt will: perform >30 minutes yardwork/housework without AD without increased hip pain in 12 weeks      If you have any questions or concerns, please don't hesitate to call.    Sincerely,      Radha Hearn, PT        Physician recommendation:     ___ Follow therapist's recommendation        ___ Modify therapy      *Physician co-signature indicates they certify the need for these services  furnished within this plan and while under their care.      [facility]   Hip Initial Evaluation    Patient Name: Brianna Wesley  Date of evaluation: 1/17/2018  Referral Diagnosis: R hip JONATHAN  Referring provider: Zoila Olea MD Dr. Kristoffer Breien [facility]  Visit Diagnosis:     ICD-10-CM    1. Right hip pain M25.551    2. Weakness of right lower extremity R29.898    3. Gait abnormality R26.9        Assessment:   Brianna Wesley is a 67 y.o. female who presents to therapy today with chief complaints of R sided hip pain and LE weakness s/p R JONATHAN on 1/12/18.  Patient reports surgery went well and stayed one night in the hospital; however she woke after surgery with numbness into the side of her calf and foot due to sciatic nerve aggravation during surgery. She has not regained strength in ankle and presents with AFO today. Evaluation reveals: significant weakness in R LE, gait deviation with walker, R hip pain and decreased mobility. Patient will benefit from 1:1 skilled physical therapy services to address the above limitations.     Goals:  Pt. will be independent with home exercise program in : 2 weeks  Pt will: ambulate without AD without increased hip pain for >10 minutes for exercise in 12 weeks  Pt will: ascend/descend 12 stairs reciprocally with 1 HR without increased pain in 12 weeks  Pt will: transfer sit<>supine without UE assistance for R LE in 12 weeks  Pt will: perform >30 minutes yardwork/housework without AD without increased hip pain in 12 weeks    Patient's expectations/goals are realistic.    Barriers to Learning or Achieving Goals:  No Barriers.       Plan / Patient Instructions:        Plan of Care:   Authorization / Certification Start Date: 01/17/18  Authorization / Certification End Date: 04/17/18  Authorization / Certification Number of Visits: up to 12 visits   Communication with: Referral Source  Patient Related Instruction: Nature of Condition;Treatment plan  "and rationale;Self Care instruction;Basis of treatment  Times per Week: 1-2x/week  Number of Weeks: up to 12 weeks  Number of Visits: up to 12 visits   Therapeutic Exercise: ROM;Stretching;Strengthening  Neuromuscular Reeducation: kinesio tape;posture;balance/proprioception;TNE  Manual Therapy: soft tissue mobilization;myofascial release;joint mobilization;muscle energy  Other Plan #2: therapeutic activity     Plan for next visit: cont strengthening as tolerated      Subjective:         Brianna Wesley is a 66 y/o female who presents today with chief c/o R sided hip pain and LE weakness s/p R JONATHAN on 18.  Patient reports surgery went well and stayed one night in the hospital; however she woke after surgery with numbness into the side of her calf and foot. Pt states MD believes sciatic nerve was aggravated when it was moved during surgery. She has not regained strength in ankle since and presents with AFO today. Denies hip pain with walking, stairs, only \"stiffness\" in right hip.   No longer using oxycodone. Discharged home, no home PT. Next FU summit is the     PLF: active, no AD before surgery. Pt enjoys walking, yard work, normal activity, stairs    Social information:   Living Situation: lives in a house with her , one level. Laundry in the basement, shower in the basement. Uses walker at home at all times.    Occupation: retired       Pain Ratin  Pain rating at best: 0  Pain rating at worst: 4  Pain description: aching    Patient reports benefit from:  rest         Objective:      Note: Items left blank indicates the item was not performed or not indicated at the time of the evaluation.    Patient Outcome Measures :      Hip Examination  1. Right hip pain     2. Weakness of right lower extremity     3. Gait abnormality       Precautions/Restrictions: R JONATHAN Posterior approach: no IR, no adduction, hip flx >90  Involved Side: Right    Posture Observation:   Standing: unweights R LE with " walker     Gait Observation:   Pt ambulates with standard walker and AFO on R.   Decreased hip extension noted on R, heel strike present.   Slight internal rotation of R hip with amb.   Ambulates on stairs with step-to pattern ascending and descending with B HR.       Seated hip strength:   Hip flexion: R: 4/5, L 5/5  Knee extension: R 4/5, L 5/5  Ankle DF: R 1/5, L 5/5    L SLR to approx 90  R SLR no increased NTT, tested to approx 50 deg, no adduction added     Hip ROM:    Date:      Hip ROM( ) AROM in degrees AROM in degrees AROM in degrees    Right Left Right Left Right Left   Hip Flexion (0-120 ) NT WNL       Hip Abduction (0-45 ) NT WNL       Hip External Rotation (0-50 ) NT WNL       Hip Internal Rotation (0-40 ) NT WNL       Hip Extension (0-15 ) NT WNL        PROM in degrees PROM in degrees PROM in degrees    Right Left Right Left Right Left   Hip Flexion (0-120 )         Hip Abduction (0-45 )         Hip External Rotation (0-50 )         Hip Internal Rotation (0-40 )         Hip Extension (0-15 )                                       Hip/Knee Strength     Date:      Hip/Knee Strength (/5) MMT MMT MMT    Right Left Right Left Right Left   Hip Flexion         Hip Abduction         Hip Adduction         Hip Extension         Hip External Rotation         Hip Internal Rotation         Knee Extension         Knee Flexion             Flexibility:  Brian test:            Hip Special Tests  OA Right (+/-) Left (+/-) Intra Articular Right (+/-) Left (+/-)   Hip Scour   ZAHRA     Test Cluster  -Hip pain  -Hip IR <15   -Hip Flex <115    FADIR     Test Cluster  -Painful Hip IR  ->50 years old  -Morning Stiffness <60 min   Passive Supine Rotation Test     SIJ Right (+/-) Left (+/-) Lateral Rim Impingement     SIJ Compression   Other     SIJ Distraction   Other     POSH Test   Other     Sacral Thrust   Other           Palpation:                          Treatment Today     TREATMENT MINUTES COMMENTS   Evaluation 15 Hip  evaluation    Self-care/ Home management     Manual therapy     Neuromuscular Re-education     Therapeutic Activity     Therapeutic Exercises 35 Initiated HEP:  Exercises:  Exercise #1: Continue from previous HEP: 2x/day  Comment #1: heel slides, SAQ, quad set  Exercise #2: SLR   Comment #2: unable to perform today   Exercise #3: Step up R LE leading  Comment #3: In clinc only with B UE support x 10 reps   Exercise #4: Ab set  Comment #4: HEP x 10 seconds x 15-20 reps  Exercise #5: Bridges  Comment #5: HEP x 3 seconds x 10-15 reps   Exercise #6: Seated sciatic slump slider  Comment #6: HEP x 10 reps x 5 x/day  Exercise #7: Seated gastroc stretch  Comment #7: HEP x 30 seconds x 2-3 reps     Discussed precautions for R JONATHAN and slow process of nerve regeneration/healing.      Gait training     Modality__________________            50 Pt arrived 10+ minutes late to appt today.   Total     Blank areas are intentional and mean the treatment did not include these items.              Radha Hearn  1/17/2018  2:37 PM

## 2021-06-15 NOTE — ANESTHESIA CARE TRANSFER NOTE
Last vitals: R-20, Temp-36.3  Vitals:    01/12/18 1440   BP: 135/64   Pulse: 93   Resp:    Temp:    SpO2: 100%     Patient's level of consciousness is drowsy  Spontaneous respirations: yes  Maintains airway independently: yes  Dentition unchanged: yes  Oropharynx: oropharynx clear of all foreign objects    QCDR Measures:  ASA# 20 - Surgical Safety Checklist: WHO surgical safety checklist completed prior to induction  PQRS# 430 - Adult PONV Prevention: 4558F - Pt received => 2 anti-emetic agents (different classes) preop & intraop  ASA# 8 - Peds PONV Prevention: NA - Not pediatric patient, not GA or 2 or more risk factors NOT present  PQRS# 424 - Jordana-op Temp Management: 4559F - At least one body temp DOCUMENTED => 35.5C or 95.9F within required timeframe  PQRS# 426 - PACU Transfer Protocol: - Transfer of care checklist used  ASA# 14 - Acute Post-op Pain: ASA14B - Patient did NOT experience pain >= 7 out of 10

## 2021-06-15 NOTE — ANESTHESIA PROCEDURE NOTES
Spinal Block    Patient location during procedure: OR  Start time: 1/12/2018 1:26 PM  End time: 1/12/2018 1:30 PM  Reason for block: primary anesthetic    Staffing:  Performing  Anesthesiologist: IRMA CROUCH    Preanesthetic Checklist  Completed: patient identified, risks, benefits, and alternatives discussed, timeout performed, consent obtained, airway assessed, oxygen available, suction available, emergency drugs available and hand hygiene performed  Spinal Block  Patient position: sitting  Prep: ChloraPrep and site prepped and draped  Patient monitoring: blood pressure, continuous pulse ox, cardiac monitor and heart rate  Approach: midline  Location: L3-4  Injection technique: single-shot  Needle type: pencil-tip   Needle gauge: 24 G

## 2021-06-15 NOTE — PATIENT INSTRUCTIONS - HE
Advance Directive  Patient s advance directive was discussed and I am comfortable with the patient s wishes.  Patient Education   Personalized Prevention Plan  You are due for the preventive services outlined below.  Your care team is available to assist you in scheduling these services.  If you have already completed any of these items, please share that information with your care team to update in your medical record.  Health Maintenance   Topic Date Due     HEPATITIS C SCREENING  1950     ZOSTER VACCINES (1 of 2) 09/27/2001     MAMMOGRAM  05/01/2021     MEDICARE ANNUAL WELLNESS VISIT  03/02/2022     FALL RISK ASSESSMENT  03/02/2022     LIPID  02/12/2024     ADVANCE CARE PLANNING  02/12/2024     COLORECTAL CANCER SCREENING  06/19/2025     TD 18+ HE  01/02/2028     DEXA SCAN  07/31/2032     Pneumococcal Vaccine: 65+ Years  Completed     INFLUENZA VACCINE RULE BASED  Completed     Pneumococcal Vaccine: Pediatrics (0 to 5 Years) and At-Risk Patients (6 to 64 Years)  Aged Out     HEPATITIS B VACCINES  Aged Out

## 2021-06-15 NOTE — ANESTHESIA PREPROCEDURE EVALUATION
Anesthesia Evaluation      Patient summary reviewed   No history of anesthetic complications     Airway   Mallampati: II  Neck ROM: full   Pulmonary - negative ROS and normal exam                          Cardiovascular - negative ROS and normal exam  Exercise tolerance: > or = 4 METS  (+) , hypercholesterolemia,      Neuro/Psych - negative ROS     Endo/Other - negative ROS      GI/Hepatic/Renal - negative ROS           Dental - normal exam                        Anesthesia Plan  Planned anesthetic: spinal  Decadron, Zofran.  Diprivan infusion.  Ketamine (0.5 mg/kg).  ASA 2     Anesthetic plan and risks discussed with: patient and spouse  Anesthesia plan special considerations: antiemetics,   Post-op plan: routine recovery

## 2021-06-15 NOTE — PROGRESS NOTES
[facility] Daily Progress     Patient Name: Brianna Wesley  Date: 1/25/2018  Visit #: 3/12 - Medicare  PTA visit #:    Referral Diagnosis: s/p R hip JONATHAN  Referring provider: Zoila Olea MD Dr. Kristoffer Breien   Visit Diagnosis:     ICD-10-CM    1. Right hip pain M25.551    2. Weakness of right lower extremity R29.898    3. Gait abnormality R26.9          Assessme   Patient's strength continues to improve in R LE overall. She has transitioned to using SPC which appears to be appropriate. No changes in R foot strength or paresthesias. Given patient's progress, plan to continue POC with visits 1x/week.     Patient is benefitting from skilled physical therapy and is making steady progress toward functional goals.  Patient is appropriate to continue with skilled physical therapy intervention, as indicated by initial plan of care.    Goal Status:  Pt. will be independent with home exercise program in : 2 weeks  Pt will: ambulate without AD without increased hip pain for >10 minutes for exercise in 12 weeks  Pt will: ascend/descend 12 stairs reciprocally with 1 HR without increased pain in 12 weeks  Pt will: transfer sit<>supine without UE assistance for R LE in 12 weeks  Pt will: perform >30 minutes yardwork/housework without AD without increased hip pain in 12 weeks    Plan / Patient Education:     Continue with initial plan of care.  Progress with home program as tolerated.    Subjective:   Patient continues to report improved strength- she has started using SPC without c/o pain.   Pt to see MD January 30th.  No change in foot mm activation, numbness or tingling since last visit.     Objective:     Presents SPC, AFO on R    Treatment Today     TREATMENT MINUTES COMMENTS   Evaluation     Self-care/ Home management     Manual therapy 9 Posterior glide R foot grade III with oscillations at TC joint   Posterior glide to distal R tib fib joint grade III    Neuromuscular Re-education     Therapeutic  Activity     Therapeutic Exercises 20 Upright bike WL 1, 4:00     Exercises reviewed and progressed:   Exercise #1: Continue from previous HEP: 2x/day  Comment #1: heel slides, SAQ, quad set  Exercise #2: SLR supine and sidelying  Comment #2: HEP x 10 reps with quad set - using belt/sheet   Exercise #3: Step up R LE leading  Comment #3: In clinc only with B UE support x 10 reps   Exercise #4: Ab set  Comment #4: HEP advanced with marching x 20 reps  Exercise #5: Bridges  Comment #5: HEP x 3 seconds x 10-15 reps   Exercise #6: Seated sciatic slump slider  Comment #6: HEP x 10 reps x 5 x/day  Exercise #7: Seated gastroc stretch  Comment #7: HEP x 30 seconds x 2-3 reps   Exercise #8: Clamshell HEP no resistance x 15-20 reps        Gait training     Modality__________________                Total 29    Blank areas are intentional and mean the treatment did not include these items.       Radha Hearn  1/25/2018

## 2021-06-15 NOTE — ANESTHESIA POSTPROCEDURE EVALUATION
Patient: Brianna Wesley  RIGHT TOTAL HIP ARTHROPLASTY  Anesthesia type: spinal    Patient location: PACU  Last vitals:   Vitals:    01/12/18 1520   BP: 108/57   Pulse: (!) 102   Resp: 13   Temp: 37.4  C (99.4  F)   SpO2: 95%     Post vital signs: stable  Level of consciousness: awake and responds to simple questions  Post-anesthesia pain: pain controlled  Post-anesthesia nausea and vomiting: no  Pulmonary: unassisted, return to baseline, nasal cannula  Cardiovascular: stable and blood pressure at baseline  Hydration: adequate  Anesthetic events: no    QCDR Measures:  ASA# 11 - Jordana-op Cardiac Arrest: ASA11B - Patient did NOT experience unanticipated cardiac arrest  ASA# 12 - Jordana-op Mortality Rate: ASA12B - Patient did NOT die  ASA# 13 - PACU Re-Intubation Rate: NA - No ETT / LMA used for case  ASA# 10 - Composite Anes Safety: ASA10A - No serious adverse event    Additional Notes:

## 2021-06-15 NOTE — PROGRESS NOTES
Preoperative Consultation    Brianna Wesley   67 y.o.  female    Date of visit: 2018    This is a preoperative consultation requested by Dr. Victor Hugo Cantrell in preparation for hip replacement surgery on  at [facility], fax [phone].    HPI:  Brianna comes in today for preoperative consultation prior to having a right hip replaced.  She has been struggling with end-stage arthritis and can no longer bend to shave her legs or put her shoes on.  Had pain in that hip for the last couple of years and is getting progressively worse.  She walks with a limp and is preventing her from doing normal things such as staying with her grandchildren as she has declined many flights of stairs.  She denies any recent hospitalizations or surgeries but did develop a URI about 3-4 days ago and it is improving.  She has just clear phlegm production.  Denies any myalgias, fevers chills or sweats.    Review of systems:  A comprehensive review of systems was performed and was otherwise negative    No Known Allergies    Recent anticoagulant use: no    Personal or family history of clotting disorder: no    Prolonged steroid use in the past year: no    Past difficulty with anesthesia:  no    Family history of difficulty with anesthesia: no    Current cardiopulmonary symptoms: no    Patient Active Problem List   Diagnosis     Osteopenia     Hypercholesteremia     Past Medical History:   Diagnosis Date     Uterine carcinoma     in situ      Past Surgical History:   Procedure Laterality Date     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, ovaries remain     Family History   Problem Relation Age of Onset     Dementia Mother 85      age 88 with bedsore     Heart disease Father      alive in his 90s     Breast cancer Neg Hx      Social History   Substance Use Topics     Smoking status: Never Smoker     Smokeless tobacco: Not on file     Alcohol use Not on file     Social History     Social History Narrative     She is  and has one daughter and one graddaughter.  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  She occasionally drinks alcohol.  She does not smoke cigarettes and tries to go to the gym on a regular basis.     Current Medications:  Current Outpatient Prescriptions   Medication Sig     atorvastatin (LIPITOR) 10 MG tablet TAKE 1 TABLET BY MOUTH EVERY DAY     cholecalciferol, vitamin D3, 1,000 unit tablet Take 2,000 Units by mouth daily.     folic acid (FOLVITE) 800 MCG tablet Take 400 mcg by mouth daily.       Physical Exam:    General Appearance:   Pleasant and alert    Vitals:    01/02/18 1055   BP: 138/82   Patient Site: Left Arm   Patient Position: Sitting   Cuff Size: Adult Regular   Pulse: (!) 107   Weight: 165 lb 12.8 oz (75.2 kg)     Body mass index is 27.59 kg/(m^2).    EYES: Eyelids, conjunctiva, and sclera were normal. Pupils were normal.   HEAD, EARS, NOSE, MOUTH, AND THROAT: Head is atraumatic, ears and canals are normal with normal tympanic membranes.  Nose mouth and throat are without lesions.  NECK: Neck appearance was normal. There were no neck masses and the thyroid was not enlarged.  RESPIRATORY: Normal respirations.  Lung sounds were equal bilaterally.  CARDIOVASCULAR: Heart rate and rhythm were normal.  S1 and S2 were normal and there were no extra sounds or murmurs. There was no peripheral edema.  GASTROINTESTINAL: The abdomen was soft and nondistended.     MUSCULOSKELETAL: Skeletal configuration was normal and muscle mass was normal for age. Joint appearance was overall normal.  LYMPHATIC: There were no enlarged nodes palpable.  SKIN/HAIR/NAILS: Skin color was normal.  No rashes.  NEUROLOGIC: The patient was alert and oriented.  Speech was normal.  There is no facial asymmetry.   PSYCHIATRIC:  Mood and affect were normal.         EKG (done here and read by me) normal sinus rhythm with no acute ST-T changes    Results for orders placed or  performed in visit on 01/02/18   HM2(CBC w/o Differential)   Result Value Ref Range    WBC 5.3 4.0 - 11.0 thou/uL    RBC 4.47 3.80 - 5.40 mill/uL    Hemoglobin 13.5 12.0 - 16.0 g/dL    Hematocrit 40.1 35.0 - 47.0 %    MCV 90 80 - 100 fL    MCH 30.2 27.0 - 34.0 pg    MCHC 33.6 32.0 - 36.0 g/dL    RDW 11.1 11.0 - 14.5 %    Platelets 301 140 - 440 thou/uL    MPV 6.1 (L) 7.0 - 10.0 fL   Electrocardiogram Perform and Read   Result Value Ref Range    SYSTOLIC BLOOD PRESSURE  mmHg    DIASTOLIC BLOOD PRESSURE  mmHg    VENTRICULAR RATE 96 BPM    ATRIAL RATE 96 BPM    P-R INTERVAL 154 ms    QRS DURATION 70 ms    Q-T INTERVAL 360 ms    QTC CALCULATION (BEZET) 454 ms    P Axis 45 degrees    R AXIS -3 degrees    T AXIS 61 degrees    MUSE DIAGNOSIS       Normal sinus rhythm  Normal ECG  No previous ECGs available         Assessment and Plan:  Brianna Wesley was seen in preoperative consultation in preparation for hip replacement surgery.  This is a intermediate risk surgery and the patient has no increased risk for major cardiac complications based on exam and history and appears to be medically stable and an acceptable candidate for the proposed surgery/procedure with appropriate anesthesia.    I have recommended the following medication adjustments preoperatively:    No vitamins or fish oil until after surgery, hold a.m. medications the day of surgery.    Also discussed during this visit:    1. Preop exam for internal medicine  Stable for surgery, should be able to return home with family after surgery and not need a TCU.  - Electrocardiogram Perform and Read  - Basic Metabolic Panel  - 2(CBC w/o Differential)    2. Hypercholesteremia  She remains on a statin.  - Hepatic Profile  - Lipid [place]        Zoila Olea MD  Internal Medicine  [facility]  Contact me at [phone]    Much or all of the text in this note was generated through the use of Dragon Dictate voice-to-text software. Errors in spelling  or words which seem out of context are unintentional. Sound alike errors, in particular, may have escaped editing.

## 2021-06-16 PROBLEM — M16.9 HIP OSTEOARTHRITIS: Status: ACTIVE | Noted: 2018-01-12

## 2021-06-16 PROBLEM — H93.12 TINNITUS, LEFT: Status: ACTIVE | Noted: 2021-03-02

## 2021-06-16 PROBLEM — H90.42 SENSORINEURAL HEARING LOSS (SNHL) OF LEFT EAR WITH UNRESTRICTED HEARING OF RIGHT EAR: Status: ACTIVE | Noted: 2021-03-02

## 2021-06-16 NOTE — PROGRESS NOTES
"[facility] Daily Progress     Patient Name: Brianna Wesley  Date: 2/20/2018  Visit #: 5/12 - Medicare  PTA visit #:    Referral Diagnosis: s/p R hip JONATHAN  Referring provider: Zoila Olea MD Dr. Kristoffer Breien   Visit Diagnosis:     ICD-10-CM    1. Right hip pain M25.551    2. Weakness of right lower extremity R29.898    3. Gait abnormality R26.9          Assessme   Patient continues to progress with regards to her right hip. She is able to perform all ADL's without hip pain and is compliant and moviated. She however has not had any improvement in right ankle or foot strength. She continues to display significant foot drop, and requries AFO. No change in ankle DF or eversion mm activation today. Pt c/o increased lateral foot and dorsum of foot pain which is causing her to wake frequently at night- suggested pt should contact MD regarding this intense pain, as it is affecting her sleep and daily life. Pt may benefit from medication such as gabapentin for nerve Sx. Discussed this with pt and encouraged her to call MD before her upcoming trip to [place].  No additional visits scheduled at this time. Pt has been given HEP for both ankle and right hip. Plan to hold chart open 30-60 days, until after pt sees MD and is back from [place].     Goal Status:  Pt. will be independent with home exercise program in : 2 weeks  Pt will: ambulate without AD without increased hip pain for >10 minutes for exercise in 12 weeks  Pt will: ascend/descend 12 stairs reciprocally with 1 HR without increased pain in 12 weeks  Pt will: transfer sit<>supine without UE assistance for R LE in 12 weeks  Pt will: perform >30 minutes yardwork/housework without AD without increased hip pain in 12 weeks    Plan / Patient Education:     Continue with initial plan of care.  Progress with home program as tolerated.    Subjective:   \"Hip is great!\".   Patient reports no changes in function of right foot. Continues to need AFO. No " improvement in mm activation or ankle ROM.   Notices increased pain- intense numbness/tingling and pain across lateral and dorsum area of foot, especially at night.     Objective:     Presents with AFO on R    Treatment Today     TREATMENT MINUTES COMMENTS   Evaluation     Self-care/ Home management     Manual therapy 5 Posterior glide R foot grade III with oscillations at TC joint     Discussion of patient's progress. Pt to FU with MD in 3 weeks. Suggested pt cn contact MD regarding intense nerve pain, as pt may benefit from medication such as gabapentin. Pt in agreement as she will be leaving for [place] at beginning of March and is having difficulty sleeping at night.      Neuromuscular Re-education     Therapeutic Activity     Therapeutic Exercises 23 Upright bike WL 2, 5:00     Added to HEP:  - Heel raises B x 10 reps x 3 sets  - Gastroc stretch on R x 30 seconds x 2-3 reps  - Step up with R LE x 10 reps x 3 sets, cues for heel into ground for glut activation  - SLS with finger tip or great toe contralateral x 30 seconds x 2-3 reps     Gait training     Modality__________________                Total 28    Blank areas are intentional and mean the treatment did not include these items.       Radha Hearn  2/20/2018

## 2021-06-17 NOTE — PROGRESS NOTES
"Rehabilitation Daily Progress     Patient Name: Brianna Wesley  Date: 2021  Visit #: 2  Referral Diagnosis: vertigo  Referring provider: Bisi Matute MD  Visit Diagnosis:     ICD-10-CM    1. Benign paroxysmal positional vertigo, right  H81.11    2. Unsteadiness on feet  R26.81    3. Decreased activities of daily living (ADL)  Z78.9    4. Benign paroxysmal positional vertigo of left ear  H81.12        Assessment:     Patient is appropriate to continue with skilled occupational therapy intervention, as indicated by initial plan of care. Patient was feeling \"really good after last treatment until last Wednesday\". She reports that her symptoms recurred with intensity last week including dry heaving and vertigo. Patient treated with right Epley maneuver today for mild right posterior canal BPPV. She was also instructed adaptation and substitution exercises as her most recent symptoms are consistent with viral neuritis.    Goal Status:  Patient will bend: to dress;without vertigo;without loss of balance;in 12 weeks  Patient able to perform bed mobility: without vertigo;in 12 weeks  Patient will look up / down: without vertigo;for drinking;in 12 weeks      Plan / Patient Education:     Repeat positional tests and progress HEP    Subjective:     Pain Ratin    Objective:     Positional Tests:  Hallpike Right:  Abnormal - Nystagmus right torsional, up beating, a few beats at 3 seconds into Hallpike  Hallpike Left:  Negative  Supine Head Center:  NT  Head Roll Right: NT  Head Roll Left:  NT    Treatment Today:  Patient treated with right Epley maneuver today for mild right posterior canal BPPV. She was also instructed adaptation and substitution exercises as her most recent symptoms are consistent with viral neuritis.  X1 viewing-60 seconds-instructed  Gait with head motion-instructed  Perturbed surface eyes closed-instructed  TREATMENT MINUTES COMMENTS   Evaluation     Self-care/ Home management   "   Neuromuscular Re-education 20    Canalith repositioning procedure 5          Total 25    Blank areas are intentional and mean the treatment did not include these items.          Deborah Caldwell  5/24/2021  7:33 AM

## 2021-06-17 NOTE — PATIENT INSTRUCTIONS - HE
Patient Instructions by Bisi Matute MD at 2/12/2019  7:50 AM     Author: Bisi Matute MD Service: -- Author Type: Physician    Filed: 2/12/2019  8:34 AM Encounter Date: 2/12/2019 Status: Addendum    : Bisi Matute MD (Physician)    Related Notes: Original Note by Bisi Matute MD (Physician) filed at 2/12/2019  8:34 AM           BP Readings from Last 7 Encounters:   02/12/19 (!) 156/94   01/14/18 137/80   01/02/18 138/82   11/22/16 132/72   03/23/15 130/82     Keep a check of blood pressure outside the clinic 1-2 times per week and return for clinic visit in 1 months time.  Sooner if most blood pressure readings are greater than 160/ 90mmHg   Also seek help immediately if you have  any symptoms referable to  elevated blood pressure, specifically chest pain, palpitations, shortness of breath or swelling in the legs.    CALL INSURANCE ABOUT SHINGRIX VACCINE    Patient Education     Controlling High Blood Pressure  High blood pressure (hypertension) is often called the silent killer. This is because many people who have it dont know it. High blood pressure can raise your risk of heart attack, stroke, and heart failure. Controlling your blood pressure can decrease your risk of these problems. Know your blood pressure and remember to check it regularly. Doing so can save your life.  Blood pressure measurements are given as 2 numbers. Systolic blood pressure is the upper number. This is the pressure when the heart contracts. Diastolic blood pressure is the lower number. This is the pressure when the heart relaxes between beats.  Blood pressure is categorized as normal, elevated, or stage 1 or stage 2 high blood pressure:    Normal blood pressure is systolic of less than 120 and diastolic of less than 80 (120/80)    Elevated blood pressure is systolic of 120 to 129 and diastolic less than 80    Stage 1 high blood pressure is systolic is 130 to 139 or diastolic between 80 to 89    Stage 2 high  blood pressure is when systolic is 140 or higher or the diastolic is 90 or higher  Here are some things you can do to help control your blood pressure.    Choose heart-healthy foods    Select low-salt, low-fat foods. Limit sodium intake to 2,400 mg per day or the amount suggested by your healthcare provider.    Limit canned, dried, cured, packaged, and fast foods. These can contain a lot of salt.    Eat 8 to 10 servings of fruits and vegetables every day.    Choose lean meats, fish, or chicken.    Eat whole-grain pasta, brown rice, and beans.    Eat 2 to 3 servings of low-fat or fat-free dairy products.    Ask your doctor about the DASH eating plan. This plan helps reduce blood pressure.    When you go to a restaurant, ask that your meal be prepared with no added salt.  Maintain a healthy weight    Ask your healthcare provider how many calories to eat a day. Then stick to that number.    Ask your healthcare provider what weight range is healthiest for you. If you are overweight, a weight loss of only 3% to 5% of your body weight can help lower blood pressure. Generally, a good weight loss goal is to lose 10% of your body weight in a year.    Limit snacks and sweets.    Get regular exercise.  Get up and get active    Choose activities you enjoy. Find ones you can do with friends or family. This includes bicycling, dancing, walking, and jogging.    Park farther away from building entrances.    Use stairs instead of the elevator.    When you can, walk or bike instead of driving.    [place] leaves, garden, or do household repairs.    Be active at a moderate to vigorous level of physical activity for at least 40 minutes for a minimum of 3 to 4 days a week.   Manage stress    Make time to relax and enjoy life. Find time to laugh.    Communicate your concerns with your loved ones and your healthcare provider.    Visit with family and friends, and keep up with hobbies.  Limit alcohol and quit smoking    Men should have no more  than 2 drinks per day.    Women should have no more than 1 drink per day.    Talk with your healthcare provider about quitting smoking. Smoking significantly increases your risk for heart disease and stroke. Ask your healthcare provider about community smoking cessation programs and other options.  Medicines  If lifestyle changes arent enough, your healthcare provider may prescribe high blood pressure medicine. Take all medicines as prescribed. If you have any questions about your medicines, ask your healthcare provider before stopping or changing them.   Date Last Reviewed: 4/27/2016 2000-2017 [employer]. [address] [place] [address]. All rights reserved. This information is not intended as a substitute for professional medical care. Always follow your healthcare professional's instructions.           Patient Education     Exercise for a Healthier Heart  You may wonder how you can improve the health of your heart. If youre thinking about exercise, youre on the right track. You dont need to become an athlete, but you do need a certain amount of brisk exercise to help strengthen your heart. If you have been diagnosed with a heart condition, your doctor may recommend exercise to help stabilize your condition. To help make exercise a habit, choose safe, fun activities.       Be sure to check with your health care provider before starting an exercise program.    Why exercise?  Exercising regularly offers many healthy rewards. It can help you do all of the following:    Improve your blood cholesterol levels to help prevent further heart trouble    Lower your blood pressure to help prevent a stroke or heart attack    Control diabetes, or reduce your risk of getting this disease    Improve your heart and lung function    Reach and maintain a healthy weight    Make your muscles stronger and more limber so you can stay active    Prevent falls and fractures by slowing the loss of bone mass  (osteoporosis)    Manage stress better  Exercise tips  Ease into your routine. Set small goals. Then build on them.  Exercise on most days. Aim for a total of 150 or more minutes of moderate to  vigorous intensity activity each week. Consider 40 minutes, 3 to 4 times a week. For best results, activity should last for 40 minutes on average. It is OK to work up to the 40 minute period over time. Examples of moderate-intensity activity is walking one mile in 15 minutes or 30 to 45 minutes of yard work.  Step up your daily activity level. Along with your exercise program, try being more active throughout the day. Walk instead of drive. Do more household tasks or yard work.  Choose one or more activities you enjoy. Walking is one of the easiest things you can do. You can also try swimming, riding a bike, or taking an exercise class.  Stop exercising and call your doctor if you:    Have chest pain or feel dizzy or lightheaded    Feel burning, tightness, pressure, or heaviness in your chest, neck, shoulders, back, or arms    Have unusual shortness of breath    Have increased joint or muscle pain    Have palpitations or an irregular heartbeat      9709-0360 [employer]. [address] [place], [place] [id number]. All rights reserved. This information is not intended as a substitute for professional medical care. Always follow your healthcare professional's instructions.         Patient Education   Urinary Incontinence, Female (Adult)  Urinary incontinence means loss of control of the bladder. This problem affects many women, especially as they get older. If you have incontinence, you may be embarrassed to ask for help. But know that this problem can be treated.  Types of Incontinence  There are different types of incontinence. Two of the main types are described here. You can have more than one type.    Stress incontinence. With this type, urine leaks when pressure (stress) is put on the bladder. This may  happen when you cough, sneeze, or laugh. Stress incontinence most often occurs because the pelvic floor muscles that support the bladder and urethra are weak. This can happen after pregnancy and vaginal childbirth or a hysterectomy. It can also be due to excess body weight or hormone changes.    Urge incontinence (also called overactive bladder). With this type, a sudden urge to urinate is felt often. This may happen even though there may not be much urine in the bladder. The need to urinate often during the night is common. Urge incontinence most often occurs because of bladder spasms. This may be due to bladder irritation or infection. Damage to bladder nerves or pelvic muscles, constipation, and certain medicines can also lead to urge incontinence.  Treatment of urinary incontinence depends on the cause. Further evaluation is needed to find the type you have. This will likely include an exam and certain tests. Based on the results, you and your healthcare provider can then plan treatment. Until a diagnosis is made, the home care tips below can help relieve symptoms.  Home care    Do pelvic floor muscle exercises, if they are prescribed. The pelvic floor muscles help support the bladder and urethra. Many women find that their symptoms improve when doing special exercises that strengthen these muscles. To do the exercises contract the muscles you would use to stop your stream of urine, but do this when youre not urinating. Hold for 10 seconds, then relax. Repeat 10 to 20 times in a row, at least 3 times a day. Your provider may give you other instructions for how to do the exercises and how often.    Keep a bladder diary. This helps track how often and how much you urinate over a set period of time. Bring this diary with you to your next visit with the provider. The information can help your provider learn more about your bladder problem.    Lose weight, if advised to by your provider. Excess weight puts pressure  on the bladder. Your provider can help you create a weight-loss plan thats right for you. This may include exercising more and making certain diet changes.    Don't consume foods and drinks that may irritate the bladder. These can include alcohol and caffeinated drinks.    Quit smoking. Smoking and other tobacco use can lead to chronic cough that strains the pelvic floor muscles. Smoking may also damage the bladder and urethra. Talk with your provider about treatments or methods you can use to quit smoking.    If drinking large amounts of fluid causes you to have symptoms, you may be advised to limit your fluid intake. You may also be advised to drink most of your fluids during the day and to limit fluids at night.    If youre worried about urine leakage or accidents, you may wear absorbent pads to catch urine. Change the pads often. This helps reduce discomfort. It may also reduce the risk of skin or bladder infections.  Follow-up care  Follow up with your healthcare provider, or as directed. It may take some to find the right treatment for your problem. Your treatment plan may include special therapies or medicines. Certain procedures or surgery may also be options. Be sure to discuss any questions you have with your provider.  When to seek medical advice  Call the healthcare provider right away if any of these occur:    Fever of 100.4 F (38 C) or higher, or as directed by your provider    Bladder pain or fullness    Abdominal swelling    Nausea or vomiting    Back pain    Weakness, dizziness or fainting  Date Last Reviewed: 10/1/2017    5277-8486 [employer]. [address] [place] [address], [place] [address]. All rights reserved. This information is not intended as a substitute for professional medical care. Always follow your healthcare professional's instructions.     Patient Education   Treating Incontinence in Women: Nonsurgical Methods    The best treatment for you will depend on the type of  incontinence you have. Your symptoms, age, and any underlying problems that are found also affect your treatment. While some types of incontinence may eventually require surgery, nonsurgical treatments may be effective in many cases. Nonsurgical treatments include lifestyle changes, muscle-strengthening exercises, and medicines.  Nonsurgical Treatments  Treatment for stress urinary incontinence includes:    Bladder training    Lifestyle changes such as weight loss and increased activity if incontinence is due to being overweight    Medicines, if bladder training has not helped    Pelvic floor muscle exercises  Lifestyle changes    Losing weight. Excess weight puts extra pressure on the pelvic floor muscles. Exercising and eating right can help you lose weight. This helps other treatments work better.    Making certain diet changes. Some foods may make you need to urinate more, so it may be good to avoid them. These include caffeinated drinks and alcohol. Ask your healthcare provider whether these or other diet changes might be helpful.    Quitting smoking. Smoking can lead to a chronic cough that strains pelvic floor muscles. Smoking may also damage the bladder and urethra.  Pelvic floor muscle exercises  There are exercises you can do to help strengthen your pelvic floor muscles. The pelvic floor muscles act as a sling to help hold the bladder and urethra in place. These muscles also help keep the urethra closed. Weak pelvic floor muscles may allow urine to leak. To strengthen the pelvic floor muscles, do the exercises daily. In a few months, the muscles will be stronger and tighter. This can help prevent urine leakage.  Date Last Reviewed: 1/1/2017 2000-2017 [employer]. [address] [place] [address], [place] [address]. All rights reserved. This information is not intended as a substitute for professional medical care. Always follow your healthcare professional's instructions.     Patient Education      Kegel Exercises  Kegel exercises dont need special clothing or equipment. Theyre easy to learn and simple to do. And if you do them right, no one can tell youre doing them, so they can be done almost anywhere. Your healthcare provider, nurse, or physical therapist can answer any questions you have and help you get started.    A weak pelvic floor  The pelvic floor muscles may weaken due to aging, pregnancy and vaginal childbirth, injury, surgery, chronic cough, or lack of exercise. If the pelvic floor is weak, your bladder and other pelvic organs may sag out of place. The urethra may also open too easily and allow urine to leak out. Kegel exercises can help you strengthen your pelvic floor muscles. Then they can better support the pelvic organs and control urine flow.  How Kegel exercises are done  Try each of the Kegel exercises described below. When youre doing them, try not to move your leg, buttock, or stomach muscles:    Contract as if you were stopping your urine stream. But do it when youre not urinating.    Tighten your rectum as if trying not to pass gas. Contract your anus, but dont move your buttocks.    You may place a finger or 2 in the vagina and squeeze your finger with your vagina to learn which muscles to tighten.  Try to hold each Kegel for a slow count to 5. You probably wont be able to hold them for that long at first. But keep practicing. It will get easier as your pelvic floor gets stronger. Eventually, special weights that you place in your vagina may be recommended to help make your Kegels even more effective. Visit your healthcare provider if you have difficulties doing Kegel exercises.  Helpful hints  Here are some tips to follow:    Do your Kegels as often as you can. The more you do them, the faster youll feel the results.    Pick an activity you do often as a reminder. For instance, do your Kegels every time you sit down.    Tighten your pelvic floor before you sneeze, get up from a  chair, cough, laugh, or lift. This protects your pelvic floor from injury and can help prevent urine leakage.   Date Last Reviewed: 10/1/2017    5956-3935 [employer]. [address] [place] [address]. All rights reserved. This information is not intended as a substitute for professional medical care. Always follow your healthcare professional's instructions.     Patient Education   Preventing Falls in the Home  As you get older, falls are more likely. Thats because your reaction time slows. Your muscles and joints may also get stiffer, making them less flexible. Illness, medications, and vision changes can also affect your balance. A fall could leave you unable to live on your own. To make your home safer, follow these tips:    Floors    Put nonskid pads under area rugs.    Remove throw rugs.    Replace worn floor coverings.    Tack carpets firmly to each step on carpeted stairs. Put nonskid strips on the edges of uncarpeted stairs.    Keep floors and stairs free of clutter and cords.    Arrange furniture so there are clear pathways.    Clean up any spills right away.    Bathrooms    Install grab bars in the tub or shower.    Apply nonskid strips or put a nonskid rubber mat in the tub or shower.    Sit on a bath chair to bathe.    Use bathmats with nonskid backing.    Lighting    Keep a flashlight in each room.    Put a nightlight along the pathway between the bedroom and the bathroom.    8864-0513 [employer]. [address] [place] [address]. All rights reserved. This information is not intended as a substitute for professional medical care. Always follow your healthcare professional's instructions.           Advance Directive  Patients advance directive was discussed and I am comfortable with the patients wishes.  Patient Education   Personalized Prevention Plan  You are due for the preventive services outlined below.  Your care team is available to assist you in  scheduling these services.  If you have already completed any of these items, please share that information with your care team to update in your medical record.  Health Maintenance   Topic Date Due   ? ZOSTER VACCINES (1 of 2) 09/27/2001   ? FALL RISK ASSESSMENT  01/02/2019   ? DXA SCAN  07/31/2019   ? ADVANCE DIRECTIVES DISCUSSED WITH PATIENT  03/23/2020   ? MAMMOGRAM  04/24/2020   ? COLONOSCOPY  06/19/2025   ? TD 18+ HE  01/02/2028   ? PNEUMOCOCCAL POLYSACCHARIDE VACCINE AGE 65 AND OVER  Completed   ? INFLUENZA VACCINE RULE BASED  Completed   ? PNEUMOCOCCAL CONJUGATE VACCINE FOR ADULTS (PCV13 OR PREVNAR)  Completed          Advance Directive  Patients advance directive was discussed and I am comfortable with the patients wishes.  Patient Education   Personalized Prevention Plan  You are due for the preventive services outlined below.  Your care team is available to assist you in scheduling these services.  If you have already completed any of these items, please share that information with your care team to update in your medical record.  Health Maintenance   Topic Date Due   ? ZOSTER VACCINES (1 of 2) 09/27/2001   ? FALL RISK ASSESSMENT  01/02/2019   ? DXA SCAN  07/31/2019   ? ADVANCE DIRECTIVES DISCUSSED WITH PATIENT  03/23/2020   ? MAMMOGRAM  04/24/2020   ? COLONOSCOPY  06/19/2025   ? TD 18+ HE  01/02/2028   ? PNEUMOCOCCAL POLYSACCHARIDE VACCINE AGE 65 AND OVER  Completed   ? INFLUENZA VACCINE RULE BASED  Completed   ? PNEUMOCOCCAL CONJUGATE VACCINE FOR ADULTS (PCV13 OR PREVNAR)  Completed

## 2021-06-17 NOTE — PATIENT INSTRUCTIONS - HE
Patient Instructions by Bisi Matute MD at 3/11/2019 12:30 PM     Author: Bisi Matute MD Service: -- Author Type: Physician    Filed: 3/11/2019  1:33 PM Encounter Date: 3/11/2019 Status: Addendum    : Bisi Matute MD (Physician)    Related Notes: Original Note by Bisi Matute MD (Physician) filed at 3/11/2019 12:54 PM           BP Readings from Last 7 Encounters:   03/11/19 144/70   02/12/19 136/78   01/14/18 137/80   01/02/18 138/82   11/22/16 132/72   03/23/15 130/82       Wt Readings from Last 7 Encounters:   03/11/19 169 lb 9.6 oz (76.9 kg)   02/12/19 169 lb (76.7 kg)   01/12/18 162 lb (73.5 kg)   01/02/18 165 lb 12.8 oz (75.2 kg)   11/22/16 165 lb 12.8 oz (75.2 kg)   03/23/15 165 lb 1.6 oz (74.9 kg)     Patient Education     Understanding Dizziness, Balance Problems, and Fainting     The eyes, inner ear, joints, and muscles send signals to the brain to achieve balance.     Balance is a group effort of the eyes, inner ear, joints, and muscles. They each send signals to the brain about body position and head movement. Then the brain uses this information to achieve balance. When the brain receives conflicting signals, or when there is a problem with blood flow, dizziness or fainting can happen.  Vertigo  Vertigo is the feeling of spinning. It may happen if the brain receives conflicting balance signals. Vertigo is often caused by a problem in the inner ear. Problems include changes in inner ear structures, infection, swelling, or excess fluid. Sometimes vertigo is due to a brain problem, such as migraine.   Dysequilibrium  Dysequilibrium is the feeling of imbalance without a sense of spinning. It may happen if the signal path between the body and brain is disrupted. There are many causes of dysequilibrium, including diabetes, anemia, head injury, and aging.  Syncope  Syncope is losing consciousness or fainting. The brain needs oxygen-rich blood to function. The heart pumps that  blood to the brain. If there is a problem with the heart, blood flow (such as low blood pressure), or blood vessels, faintness may happen.  Date Last Reviewed: 10/6/2015    4707-1076 [employer]. [address] [place] [address], [place] [address]. All rights reserved. This information is not intended as a substitute for professional medical care. Always follow your healthcare professional's instructions.

## 2021-06-23 NOTE — PROGRESS NOTES
"  Assessment and Plan:     1. Routine general medical examination at a health care facility  Lipid [place] FASTING    Comprehensive Metabolic Panel   2. Vaginal discharge           The patient's current medical problems were reviewed.    I have reviewed her medical history.  This shows uterine cancer status post hysterectomy.  Review of chart and archives show that it was carcinoma in situ?!  This is done after the patient has left and I will call her to review.      I have had an Advance Directives discussion with the patient.  The following health maintenance schedule was reviewed with the patient and provided in printed form in the after visit summary:   Health Maintenance   Topic Date Due     ZOSTER VACCINES (1 of 2) 09/27/2001     DXA SCAN  07/31/2019     FALL RISK ASSESSMENT  02/12/2020     MAMMOGRAM  04/24/2020     ADVANCE DIRECTIVES DISCUSSED WITH PATIENT  02/12/2024     COLONOSCOPY  06/19/2025     TD 18+ HE  01/02/2028     PNEUMOCOCCAL POLYSACCHARIDE VACCINE AGE 65 AND OVER  Completed     INFLUENZA VACCINE RULE BASED  Completed     PNEUMOCOCCAL CONJUGATE VACCINE FOR ADULTS (PCV13 OR PREVNAR)  Completed        Subjective:     Chief Complaint   Patient presents with     Annual Wellness Visit     Fasting today, wants to talk about \"female health\", some dizziness (soreness in the neck)        Chief Complaint: Brianna Wesley is an 68 y.o. female here for an Annual Wellness visit.     HPI: Patient generally feels well.  She is here for an annual physical exam.  Regarding her female health, she reports that occasionally she will have a thicker vaginal discharge.  This is not malodorous , it is clear.  She denies any itching.  Today she is asymptomatic.  She is status post hysterectomy.  She denies any vaginal bleeding or spotting.  On asking leading question, she may have had some pink discoloration on her wipe after urination but she is not sure about this.  This is in the past.    Sometimes she can have some " soreness in bilateral.  Again this is not persistent and does not affect her ADLs.  She remains fairly active.    She admits a good appetite.  She denies any nausea/vomiting/diarrhea.  BMs are normal.  No chest pain or shortness of breath.    Review of Systems:    Please see above.  The rest of the review of systems are negative for all systems.    Patient Care Team:  Bisi Matute MD as PCP - General (Family Medicine)     Patient Active Problem List   Diagnosis     Osteopenia     Hypercholesteremia     Hip osteoarthritis     Past Medical History:   Diagnosis Date     Hypercholesteremia      Osteopenia      Uterine carcinoma (H)     in situ      Past Surgical History:   Procedure Laterality Date     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, ovaries remain     [place] TOTAL HIP ARTHROPLASTY Right 2018    Procedure: RIGHT TOTAL HIP ARTHROPLASTY;  Surgeon: Victor Hugo Cantrell MD;  Location: [facility];  Service: Orthopedics      Family History   Problem Relation Age of Onset     Dementia Mother 85         age 88 with bedsore     Heart disease Father         alive in his 90s     Breast cancer Neg Hx       Social History     Socioeconomic History     Marital status:      Spouse name: Not on file     Number of children: Not on file     Years of education: Not on file     Highest education level: Not on file   Social Needs     Financial resource strain: Not on file     Food insecurity - worry: Not on file     Food insecurity - inability: Not on file     Transportation needs - medical: Not on file     Transportation needs - non-medical: Not on file   Occupational History     Not on file   Tobacco Use     Smoking status: Never Smoker     Smokeless tobacco: Never Used   Substance and Sexual Activity     Alcohol use: Yes     Drug use: No     Sexual activity: Not on file   Other Topics Concern     Not on file   Social History Narrative    She is  and has one daughter and one  "graddaughter.  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  She occasionally drinks alcohol.  She does not smoke cigarettes and tries to go to the gym on a regular basis.        The 10-year ASCVD risk score (Tina HAMMER Jr., et al., 2013) is: 9.7%      Values used to calculate the score:        Age: 68 years        Sex: Female        Is Non- : No        Diabetic: No        Tobacco smoker: No        Systolic Blood Pressure: 156 mmHg        Is BP treated: No        HDL Cholesterol: 39 mg/dL        Total Cholesterol: 145 mg/dL      Current Outpatient Medications   Medication Sig Dispense Refill     atorvastatin (LIPITOR) 10 MG tablet Take 1 tablet (10 mg total) by mouth at bedtime. 90 tablet 3     cholecalciferol, vitamin D3, 1,000 unit tablet Take 2,000 Units by mouth daily.       acetaminophen (TYLENOL) 325 MG tablet Take 2 tablets (650 mg total) by mouth daily as needed.  0     aspirin 325 MG tablet Take 1 tablet (325 mg total) by mouth 2 (two) times a day with meals. 60 tablet 0     OMEGA-3/DHA/EPA/FISH OIL (FISH OIL-OMEGA-3 FATTY ACIDS) 300-1,000 mg capsule Take 1 g by mouth daily.       No current facility-administered medications for this visit.       Objective:   Vital Signs:   Visit Vitals  /78   Pulse 66   Resp 20   Ht 5' 4.5\" (1.638 m)   Wt 169 lb (76.7 kg)   SpO2 96%   BMI 28.56 kg/m         VisionScreening:  No exam data present     PHYSICAL EXAM  GENERAL APPEARANCE:  Appearing stated age, smiling, alert, cooperative, and in no acute distress.   HEENT: Pupils equal, regular, react to light and accommodation. Extraocular muscles intact, fundi benign. Ear canals and tympanic membranes are normal. Lips, mouth, and throat are unremarkable.   NECK: Neck supple without adenopathy, thyromegaly or masses.   LYMPH: No anterior cervical or supraclavicular LN enlargement   PULMONARY: Normal respiratory effort. Chest is clear.   CARDIOVASCULAR: " Heart auscultation: rhythm regular, heart sounds normal S1 and S2, bruit carotid artery absent.   SKIN: Warm and well perfused..   ABDOMEN: Abdomen soft, non-tender. BS normal. No masses or organomegaly.   EXTREMITIES: Peripheral pulses are full. Extremities with no edema.   MENTAL STATUS: Alert, oriented and thought content appropriate   NEUROLOGIC: Station and gait normal, strength and movement normal, reflexes are normal and symmetric         Assessment Results 2/12/2019   Activities of Daily Living No help needed   Instrumental Activities of Daily Living No help needed   Mini Cog Total Score 4   Some recent data might be hidden     A Mini-Cog score of 0-2 suggests the possibility of dementia, score of 3-5 suggests no dementia    Identified Health Risks:     She is at risk for lack of exercise and has been provided with information to increase physical activity for the benefit of her well-being.  Information on urinary incontinence and treatment options given to patient.  She is at risk for falling and has been provided with information to reduce the risk of falling at home.  She is at risk for falling and has been provided with information about [facility].  Patient's advanced directive was discussed and I am comfortable with the patient's wishes.

## 2021-06-24 NOTE — TELEPHONE ENCOUNTER
----- Message from Bisi Matute MD sent at 2/13/2019 10:52 AM CST -----  Please inform patient that I have sent her my chart message.    I would like her to follow-up for her female health issues as I do note a history of uterine cancer in the past.  Her chart was reviewed in detail after her visit.  All of this was not addressed during annual wellness visit..    Bisi Matute MD  2/13/2019

## 2021-06-24 NOTE — PROGRESS NOTES
Assessment:     1. Dizziness  Hearing Screening    Thyroid [place]    HM1(CBC and Differential)    Ambulatory referral to Neurology    Ambulatory referral to ENT    1 (CBC with Diff)   2. Elevated blood pressure reading without diagnosis of hypertension  Thyroid [place]   3. Worries   Thyroid [place]        Syncope, Vasovagal syncope, Vertigo and /Or BPV in differential    And is concerned with her current symptoms and would like further evaluation.       Plan:      The nature of vertigo syndromes were discussed along with their usual course and treatment.  Educational materials given and questions answered.     Subjective:      Chief Complaint   Patient presents with     Follow-up     dizziness started in October and blood pressure         Brianna Wesley is a 68 y.o. female who presents for evaluation of dizziness. The symptoms started 6 months ago and are ongoing. The attacks occur frequently and last a few seconds. Positions that worsen symptoms: bending over and standing up. Previous workup/treatments: none. Associated ear symptoms: none  feels pressure in back of head. Associated CNS symptoms: none. Recent infections: She had a sinus congestion in January, however symptoms of preceding from October. Head trauma: denied. Drug ingestion: none. Noise exposure: no occupational exposure. Family history: non-contributory.    She has had worsening of dizziness with you where she has to bend down and then suddenly stand up.  This position makes her feel as if everything is spinning.  This is extremely bothersome to her.    The following portions of the patient's history were reviewed and updated as appropriate: allergies, current medications, past family history, past medical history, past social history, past surgical history and problem list.  No Known Allergies    Current Outpatient Medications on File Prior to Visit   Medication Sig Dispense Refill     atorvastatin (LIPITOR) 10 MG tablet Take 1 tablet (10 mg  total) by mouth at bedtime. 90 tablet 3     cholecalciferol, vitamin D3, 1,000 unit tablet Take 2,000 Units by mouth daily.       ketotifen (ZADITOR/ZYRTEC ITCHY EYES) 0.025 % (0.035 %) ophthalmic solution   11     OMEGA-3/DHA/EPA/FISH OIL (FISH OIL-OMEGA-3 FATTY ACIDS) 300-1,000 mg capsule Take 1 g by mouth daily.       [DISCONTINUED] acetaminophen (TYLENOL) 325 MG tablet Take 2 tablets (650 mg total) by mouth daily as needed.  0     [DISCONTINUED] aspirin 325 MG tablet Take 1 tablet (325 mg total) by mouth 2 (two) times a day with meals. 60 tablet 0     No current facility-administered medications on file prior to visit.        Patient Active Problem List   Diagnosis     Osteopenia     Hypercholesteremia     Hip osteoarthritis       Past Medical History:   Diagnosis Date     Hypercholesteremia      Osteopenia      Uterine carcinoma (H) 1985    in situ       Past Surgical History:   Procedure Laterality Date     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, ovaries remain     [place] TOTAL HIP ARTHROPLASTY Right 2018    Procedure: RIGHT TOTAL HIP ARTHROPLASTY;  Surgeon: Victor Hugo Cantrell MD;  Location: [facility];  Service: Orthopedics       Family History   Problem Relation Age of Onset     Dementia Mother 85         age 88 with bedsore     Heart disease Father         alive in his 90s     Breast cancer Neg Hx        Social History     Socioeconomic History     Marital status:      Spouse name: None     Number of children: None     Years of education: None     Highest education level: None   Occupational History     None   Social Needs     Financial resource strain: None     Food insecurity:     Worry: None     Inability: None     Transportation needs:     Medical: None     Non-medical: None   Tobacco Use     Smoking status: Never Smoker     Smokeless tobacco: Never Used   Substance and Sexual Activity     Alcohol use: Yes     Drug use: No     Sexual activity: None   Lifestyle     Physical  activity:     Days per week: None     Minutes per session: None     Stress: None   Relationships     Social connections:     Talks on phone: None     Gets together: None     Attends Zoroastrian service: None     Active member of club or organization: None     Attends meetings of clubs or organizations: None     Relationship status: None     Intimate partner violence:     Fear of current or ex partner: None     Emotionally abused: None     Physically abused: None     Forced sexual activity: None   Other Topics Concern     None   Social History Narrative    She is  and has one daughter and one graddaughter.  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  She occasionally drinks alcohol.  She does not smoke cigarettes and tries to go to the gym on a regular basis.        The 10-year ASCVD risk score (Tina HAMMER JrJordan, et al., 2013) is: 9.7%      Values used to calculate the score:        Age: 68 years        Sex: Female        Is Non- : No        Diabetic: No        Tobacco smoker: No        Systolic Blood Pressure: 156 mmHg        Is BP treated: No        HDL Cholesterol: 39 mg/dL        Total Cholesterol: 145 mg/dL       Review of Systems  A 12 point comprehensive review of systems was negative except as noted.      Objective:   /70 (Patient Site: Left Arm, Patient Position: Sitting, Cuff Size: Adult Regular)   Pulse 78   Temp 96.9  F (36.1  C) (Oral)   Wt 169 lb 9.6 oz (76.9 kg)   BMI 28.66 kg/m      GENERAL APPEARANCE:  Appearing stated age, smiling, alert, cooperative, and in no acute distress.   HEENT: Pupils equal, regular, react to light and accommodation. Extraocular muscles intact, fundi benign. Ear canals and tympanic membranes are normal. Lips, mouth, and throat are unremarkable.   NECK: Neck supple without adenopathy, thyromegaly or masses.   LYMPH: No anterior cervical or supraclavicular LN enlargement   PULMONARY: Normal  respiratory effort. Chest is clear.   CARDIOVASCULAR: Heart auscultation: rhythm regular, heart sounds normal S1 and S2, bruit carotid artery absent.   SKIN: Warm and well perfused..   ABDOMEN: Abdomen soft, non-tender. BS normal. No masses or organomegaly.   MUSCULOSKELETAL: No obvious joint swelling, deformity or limitation in range of motion, full range of motion of the back and neck without pain, strength normal and symmetric in all muscle groups.   EXTREMITIES: Peripheral pulses are full. Extremities with no edema.   MENTAL STATUS: Alert, oriented and thought content appropriate   NEUROLOGIC: gait normal, alert and oriented X 3, reflexes active and equal, R handed, speech normal, mental status intact, cranial nerves 2-12 intact, gait, including heel, toe, and tandem walking normal (though due to her foot numbness this exam is somewhat equivocal) Romberg negative, muscle tone normal, muscle strength normal, rapid alternating movements normal, finger to nose normal, reflexes normal and symmetric.

## 2021-06-24 NOTE — TELEPHONE ENCOUNTER
Reason contacted:  Referral  Information relayed:  Please inform patient that I have sent her my chart message.     I would like her to follow-up for her female health issues as I do note a history of uterine cancer in the past.   Her chart was reviewed in detail after her visit.  All of this was not addressed during annual wellness visit..   Additional questions:  No  Further follow-up needed:  No

## 2021-06-28 NOTE — PROGRESS NOTES
"Progress Notes by Anastasiia Martinez AuD at 2/10/2020  8:00 AM     Author: Anastasiia Martinez AuD Service: -- Author Type: Audiologist    Filed: 2/10/2020  9:00 AM Encounter Date: 2/10/2020 Status: Signed    : Anastasiia Martinez AuD (Audiologist)       Audiology Report    Summary: Audiology visit completed. Please see audiogram below or in \"media\" tab for case history and results.     Transducer: Insert earphones and Circumaural headphones    Reliability was good  and there was good  SRT to PTA agreement.       Plan:  The patient is returned to ENT for follow up. Return for retesting with ENT recommendation.      Bijal Tucker.  Clinical Audiologist  [place] #[id number]         "

## 2021-06-30 NOTE — PROGRESS NOTES
Progress Notes by Deborah Caldwell OT at 4/13/2021  7:30 AM     Author: Deborah Caldwell OT Service: -- Author Type: Occupational Therapist    Filed: 4/13/2021  8:03 AM Encounter Date: 4/13/2021 Status: Attested    : Deborah Caldwell OT (Occupational Therapist) Cosigner: Bisi Matute MD at 4/13/2021  8:33 AM    Attestation signed by Bisi Matute MD at 4/13/2021  8:33 AM    [facility]                    Rehabilitation Certification Request    April 13, 2021      Patient: Brianna Wesley  MR Number: [id number]  YOB: 1950  Date of Visit: 4/13/2021      Dear Dr. Bisi Matute:    Thank you for this referral.   We are seeing Brianna Wesley in Occupational Therapy for vertigo.    Medicare and/or Medicaid requires physician review and approval of the treatment plan. Please review the plan of care and verify that you agree with the therapy plan of care by co-signing this note.      Plan of Care  Authorization / Certification Start Date: 04/13/21  Authorization / Certification End Date: 07/12/21  Authorization / Certification Number of Visits: 12  Communication with: Referral Source  Patient Related Instruction: Nature of Condition;Treatment plan and rationale;Basis of treatment;Expected outcome  Times per Week: 1  Number of Weeks: 12  Number of Visits: 12  Select Plan of Care: Select  Neuromuscular Reeducation: vestibular  Functional Training (ADL's): compensatory training  Canolith Repostioning: .      Goals:  Patient will bend: to dress;without vertigo;without loss of balance;in 12 weeks  Patient able to perform bed mobility: without vertigo;in 12 weeks  Patient will look up / down: without vertigo;for drinking;in 12 weeks        If you have any questions or concerns, please don't hesitate to call.    Sincerely,      Deborah Caldwell OT        Physician recommendation:                                ___ Follow therapist's recommendation                                                                                                     ___ Modify therapy                                                                                                      Physician Signature:_____________________                                                                                                                                        Date:___________________________    *Physician co-signature indicates they certify the need for these services furnished within this plan and while under their care.         Vestibular Initial Evaluation    Patient Name: Brianna Wesley  Date of evaluation: 4/13/2021  Referral Diagnosis: vertigo  Referring provider: Bisi Matute MD  Visit Diagnosis:     ICD-10-CM    1. Unsteadiness on feet  R26.81    2. Benign paroxysmal positional vertigo, right  H81.11    3. Decreased activities of daily living (ADL)  Z78.9        Assessment:      Patient has positive right [place] - Hallpike and was treated with right canalith maneuver today. Signs and symptoms appear negative on second maneuver. Patient will return if symptoms persist or recur.    Goals:  Patient will bend: to dress;without vertigo;without loss of balance;in 12 weeks  Patient able to perform bed mobility: without vertigo;in 12 weeks  Patient will look up / down: without vertigo;for drinking;in 12 weeks    Patient's expectations/goals are realistic.    Barriers to Learning or Achieving Goals:  No Barriers.       Plan / Patient Instructions:        Plan of Care:   Authorization / Certification Start Date: 04/13/21  Authorization / Certification End Date: 07/12/21  Authorization / Certification Number of Visits: 12  Communication with: Referral Source  Patient Related Instruction: Nature of Condition;Treatment plan and rationale;Basis of treatment;Expected outcome  Times per Week: 1  Number of Weeks: 12  Number of Visits: 12  Select Plan of Care: Select  Neuromuscular Reeducation: vestibular  Functional Training  (ADL's): compensatory training  Canolith Repostioning: .      POC and pathology of condition were reviewed with patient.  Pt. is in agreement with the Plan of Care. Treatment techniques, plan of care, and goals were discussed with the patient.  The patient agrees to the plan as outlined.  The plan of care is dynamic and will be modified on an ongoing basis.       Subjective:         History of Present Illness:    Brianna is a 70 y.o. female who presents to therapy today with complaints of vertigo and unsteadiness. She also has a history of high pitch tinnitus and hearing loss in left. Patient had sudden onset of symptoms on 4-3-21. Symptoms are getting better. She has a history of similar symptoms that were successfully treated with canalith maneuvers. Patient denies ear pain. Patient denies any recent hearing changes. Functional limitations are described as occurring with balance, bending, bed mobility, head turns, looking up or down.    Pain Ratin         Objective:      Note: Items left blank indicates the item was not performed or not indicated at the time of the evaluation.    Patient Outcome Measures:   Dizziness Handicap Inventory  Score in %: 28       Vestibular Disorder Examination  1. Unsteadiness on feet     2. Benign paroxysmal positional vertigo, right     3. Decreased activities of daily living (ADL)         Precautions/Restrictions: None    Posture Observation: General standing posture is normal.    ROM:  Not Tested    Strength: Not Tested    Sensation: patient reports normal sensation    Functional Mobility: good      Modified CTSIB: NT  Normal Surface Eyes Open-  Normal Surface Eyes Closed-  Perturbed Surface Eyes Open-  Perturbed Surface Eyes Closed-    The remainder of the tests are performed in room light.    Oculomotor Assessment: NT  Spontaneous Nystagmus with fixation:   Spontaneous Nystagmus without fixation:  Gaze-evoked nystagmus:  Smooth pursuit:  Saccades:  VOR to slow movement:   Rapid  Head Shake Test:  VOR Head Thrust:  Static Visual Acuity:  Dynamic Visual Acuity:    Positional Tests:  Hallpike Right:  Abnormal - Nystagmus right torsional, up beating, 3 second latency and fatigues in 30 seconds  Hallpike Left:  Negative  Head Roll Right: NT  Head Roll Left:  NT    Plan for next visit: repeat positional tests    Treatment Today: Patient treated with right Epley maneuver for posterior canal BPPV. Signs and symptoms appear negative on second maneuver.  TREATMENT MINUTES COMMENTS   Evaluation 15    Self-care/ Home management     Neuromuscular Re-education     Canalith repositioning procedure 10          Total 25    Blank areas are intentional and mean the treatment did not include these items.     GOALS AND PLAN OF CARE WERE ESTABLISHED IN COOPERATION WITH THE PATIENT    OT Evaluation Code: (Please list factors)   Comorbidities:   Patient Active Problem List   Diagnosis   ? Osteopenia   ? Hypercholesteremia   ? Hip osteoarthritis   ? Sensorineural hearing loss (SNHL) of left ear with unrestricted hearing of right ear   ? Tinnitus, left      Profile/History Review: Brief    Need for eval modification: No    # Treatment options: Limited    Clinical Decision Making:  Low      Occupational Profile/ Medical and Therapy History and Comorbidities Occupational Performance Clinical Decision Making   (Complexity)   brief history with review of medical/therapy records related to the presenting problem.  No comorbidities 1-3 Performance deficits that result in activity limitations and/or participation restrictions.    No Assessment Modification  Low complexity, which includes  problem-focused assessments, and consideration of a limited number of treatment options.      expanded review of medical/therapy records and additional review of physical, cognitive and psychosocial history.    May have comorbidities 3-5 Performance deficits that result in activity limitations and/or participation restrictions.    Minimal  to moderate modification of assessment Moderate complexity, which includes analysis of data from detailed assessments, and consideration of several treatment options.         Review of medical/therapy records and extensive additional review of physical, cognitive and psychosocial history.  Comorbidities affect occupational performance 5 or more Performance deficits that result in activity limitations and/or participation restrictions.    Significant modification of assessment High complexity, analysis of  Occupational profile and data,  Comprehensive assessments, multiple treatment options.            Deborah Caldwell  4/13/2021  7:36 AM

## 2021-07-13 ENCOUNTER — RECORDS - HEALTHEAST (OUTPATIENT)
Dept: ADMINISTRATIVE | Facility: CLINIC | Age: 71
End: 2021-07-13

## 2021-07-21 ENCOUNTER — RECORDS - HEALTHEAST (OUTPATIENT)
Dept: ADMINISTRATIVE | Facility: CLINIC | Age: 71
End: 2021-07-21

## 2021-10-11 ENCOUNTER — HEALTH MAINTENANCE LETTER (OUTPATIENT)
Age: 71
End: 2021-10-11

## 2021-12-02 ENCOUNTER — ANCILLARY PROCEDURE (OUTPATIENT)
Dept: BONE DENSITY | Facility: CLINIC | Age: 71
End: 2021-12-02
Attending: FAMILY MEDICINE
Payer: MEDICARE

## 2021-12-02 DIAGNOSIS — Z78.0 POSTMENOPAUSAL STATUS: ICD-10-CM

## 2021-12-02 PROCEDURE — 77080 DXA BONE DENSITY AXIAL: CPT | Performed by: INTERNAL MEDICINE

## 2022-03-19 ENCOUNTER — MYC MEDICAL ADVICE (OUTPATIENT)
Dept: FAMILY MEDICINE | Facility: CLINIC | Age: 72
End: 2022-03-19
Payer: MEDICARE

## 2022-03-19 DIAGNOSIS — E78.00 HYPERCHOLESTEREMIA: ICD-10-CM

## 2022-03-21 ENCOUNTER — ANCILLARY PROCEDURE (OUTPATIENT)
Dept: MAMMOGRAPHY | Facility: CLINIC | Age: 72
End: 2022-03-21
Attending: FAMILY MEDICINE
Payer: MEDICARE

## 2022-03-21 DIAGNOSIS — Z12.31 VISIT FOR SCREENING MAMMOGRAM: ICD-10-CM

## 2022-03-21 PROCEDURE — 77067 SCR MAMMO BI INCL CAD: CPT

## 2022-03-21 RX ORDER — ATORVASTATIN CALCIUM 10 MG/1
TABLET, FILM COATED ORAL
Qty: 90 TABLET | Refills: 0 | Status: SHIPPED | OUTPATIENT
Start: 2022-03-21 | End: 2022-05-17

## 2022-05-14 ASSESSMENT — ACTIVITIES OF DAILY LIVING (ADL): CURRENT_FUNCTION: NO ASSISTANCE NEEDED

## 2022-05-17 ENCOUNTER — OFFICE VISIT (OUTPATIENT)
Dept: FAMILY MEDICINE | Facility: CLINIC | Age: 72
End: 2022-05-17
Payer: MEDICARE

## 2022-05-17 ENCOUNTER — MYC MEDICAL ADVICE (OUTPATIENT)
Dept: FAMILY MEDICINE | Facility: CLINIC | Age: 72
End: 2022-05-17

## 2022-05-17 VITALS
BODY MASS INDEX: 26.82 KG/M2 | WEIGHT: 161 LBS | RESPIRATION RATE: 16 BRPM | DIASTOLIC BLOOD PRESSURE: 73 MMHG | HEIGHT: 65 IN | HEART RATE: 78 BPM | SYSTOLIC BLOOD PRESSURE: 125 MMHG

## 2022-05-17 DIAGNOSIS — Z83.2 FAMILY HISTORY OF FACTOR V DEFICIENCY: ICD-10-CM

## 2022-05-17 DIAGNOSIS — E78.00 HYPERCHOLESTEREMIA: ICD-10-CM

## 2022-05-17 DIAGNOSIS — Z00.00 ENCOUNTER FOR MEDICARE ANNUAL WELLNESS EXAM: Primary | ICD-10-CM

## 2022-05-17 DIAGNOSIS — E78.00 HYPERCHOLESTEREMIA: Primary | ICD-10-CM

## 2022-05-17 LAB
ALBUMIN SERPL-MCNC: 4.3 G/DL (ref 3.5–5)
ALP SERPL-CCNC: 61 U/L (ref 45–120)
ALT SERPL W P-5'-P-CCNC: 14 U/L (ref 0–45)
ANION GAP SERPL CALCULATED.3IONS-SCNC: 9 MMOL/L (ref 5–18)
AST SERPL W P-5'-P-CCNC: 18 U/L (ref 0–40)
BASOPHILS # BLD AUTO: 0 10E3/UL (ref 0–0.2)
BASOPHILS NFR BLD AUTO: 1 %
BILIRUB SERPL-MCNC: 0.7 MG/DL (ref 0–1)
BUN SERPL-MCNC: 18 MG/DL (ref 8–28)
CALCIUM SERPL-MCNC: 9.6 MG/DL (ref 8.5–10.5)
CHLORIDE BLD-SCNC: 105 MMOL/L (ref 98–107)
CHOLEST SERPL-MCNC: 197 MG/DL
CO2 SERPL-SCNC: 27 MMOL/L (ref 22–31)
CREAT SERPL-MCNC: 0.89 MG/DL (ref 0.6–1.1)
EOSINOPHIL # BLD AUTO: 0.1 10E3/UL (ref 0–0.7)
EOSINOPHIL NFR BLD AUTO: 2 %
ERYTHROCYTE [DISTWIDTH] IN BLOOD BY AUTOMATED COUNT: 12.2 % (ref 10–15)
FACTOR V INTERPRETATION: NORMAL
FASTING STATUS PATIENT QL REPORTED: YES
GFR SERPL CREATININE-BSD FRML MDRD: 69 ML/MIN/1.73M2
GLUCOSE BLD-MCNC: 99 MG/DL (ref 70–125)
HCT VFR BLD AUTO: 41.5 % (ref 35–47)
HDLC SERPL-MCNC: 49 MG/DL
HGB BLD-MCNC: 14.1 G/DL (ref 11.7–15.7)
IMM GRANULOCYTES # BLD: 0 10E3/UL
IMM GRANULOCYTES NFR BLD: 0 %
LAB DIRECTOR COMMENTS: NORMAL
LAB DIRECTOR DISCLAIMER: NORMAL
LAB DIRECTOR INTERPRETATION: NORMAL
LAB DIRECTOR METHODOLOGY: NORMAL
LAB DIRECTOR RESULTS: NORMAL
LDLC SERPL CALC-MCNC: 128 MG/DL
LYMPHOCYTES # BLD AUTO: 1 10E3/UL (ref 0.8–5.3)
LYMPHOCYTES NFR BLD AUTO: 21 %
MCH RBC QN AUTO: 32.7 PG (ref 26.5–33)
MCHC RBC AUTO-ENTMCNC: 34 G/DL (ref 31.5–36.5)
MCV RBC AUTO: 96 FL (ref 78–100)
MONOCYTES # BLD AUTO: 0.5 10E3/UL (ref 0–1.3)
MONOCYTES NFR BLD AUTO: 11 %
NEUTROPHILS # BLD AUTO: 3 10E3/UL (ref 1.6–8.3)
NEUTROPHILS NFR BLD AUTO: 66 %
PLATELET # BLD AUTO: 234 10E3/UL (ref 150–450)
POTASSIUM BLD-SCNC: 4.1 MMOL/L (ref 3.5–5)
PROT SERPL-MCNC: 7 G/DL (ref 6–8)
RBC # BLD AUTO: 4.31 10E6/UL (ref 3.8–5.2)
SODIUM SERPL-SCNC: 141 MMOL/L (ref 136–145)
SPECIMEN DESCRIPTION: NORMAL
TRIGL SERPL-MCNC: 99 MG/DL
WBC # BLD AUTO: 4.6 10E3/UL (ref 4–11)

## 2022-05-17 PROCEDURE — 85025 COMPLETE CBC W/AUTO DIFF WBC: CPT | Performed by: FAMILY MEDICINE

## 2022-05-17 PROCEDURE — 81241 F5 GENE: CPT | Mod: GZ | Performed by: FAMILY MEDICINE

## 2022-05-17 PROCEDURE — 90471 IMMUNIZATION ADMIN: CPT | Performed by: FAMILY MEDICINE

## 2022-05-17 PROCEDURE — G0439 PPPS, SUBSEQ VISIT: HCPCS | Performed by: FAMILY MEDICINE

## 2022-05-17 PROCEDURE — G0452 MOLECULAR PATHOLOGY INTERPR: HCPCS | Performed by: PATHOLOGY

## 2022-05-17 PROCEDURE — 99212 OFFICE O/P EST SF 10 MIN: CPT | Mod: 25 | Performed by: FAMILY MEDICINE

## 2022-05-17 PROCEDURE — 80061 LIPID PANEL: CPT | Performed by: FAMILY MEDICINE

## 2022-05-17 PROCEDURE — 80053 COMPREHEN METABOLIC PANEL: CPT | Performed by: FAMILY MEDICINE

## 2022-05-17 PROCEDURE — 36415 COLL VENOUS BLD VENIPUNCTURE: CPT | Performed by: FAMILY MEDICINE

## 2022-05-17 PROCEDURE — 90750 HZV VACC RECOMBINANT IM: CPT | Performed by: FAMILY MEDICINE

## 2022-05-17 RX ORDER — ATORVASTATIN CALCIUM 10 MG/1
TABLET, FILM COATED ORAL
Qty: 90 TABLET | Refills: 3 | Status: SHIPPED | OUTPATIENT
Start: 2022-05-17 | End: 2023-06-30

## 2022-05-17 ASSESSMENT — ACTIVITIES OF DAILY LIVING (ADL): CURRENT_FUNCTION: NO ASSISTANCE NEEDED

## 2022-05-17 NOTE — PATIENT INSTRUCTIONS
Patient Education   Personalized Prevention Plan  You are due for the preventive services outlined below.  Your care team is available to assist you in scheduling these services.  If you have already completed any of these items, please share that information with your care team to update in your medical record.  Health Maintenance Due   Topic Date Due    Zoster (Shingles) Vaccine (1 of 2) Never done    COVID-19 Vaccine (3 - Booster for Nikky series) 03/02/2022

## 2022-05-17 NOTE — PROGRESS NOTES
"  ASSESSMENT / PLAN:       ICD-10-CM    1. Encounter for Medicare annual wellness exam  Z00.00 CBC with platelets and differential     Lipid panel reflex to direct LDL Fasting     Comprehensive metabolic panel (BMP + Alb, Alk Phos, ALT, AST, Total. Bili, TP)     CBC with platelets and differential     Lipid panel reflex to direct LDL Fasting     Comprehensive metabolic panel (BMP + Alb, Alk Phos, ALT, AST, Total. Bili, TP)   2. Hypercholesteremia  E78.00 atorvastatin (LIPITOR) 10 MG tablet   3. Family history of factor V deficiency  Z83.2 Factor 5 leiden mutation analysis     Factor 5 leiden mutation analysis     Medical history making: Patient is here today for Medicare annual wellness visit.  She does have hypercholesterolemia and is on 10 mg of Lipitor.  Her brother who lives in [place] recently had blood clots and was diagnosed with factor V deficiency and she was asked to follow-up with her primary.  We discussed testing and implications.  Patient would like to proceed.  Genetic testing form signed.  Test ordered.    Patient's spouse passed away last September after sepsis and  unable to get a hospital bed due to COVID situation.  She is working on her grief.  She has good social support.      COUNSELING:  Reviewed preventive health counseling, as reflected in patient instructions       Regular exercise       Healthy diet/nutrition       Vision screening       Dental care       Immunizations    Vaccinated for: Zoster          Estimated body mass index is 26.74 kg/m  as calculated from the following:    Height as of 3/2/21: 1.638 m (5' 4.5\").    Weight as of 3/2/21: 71.8 kg (158 lb 3.2 oz).        She reports that she has never smoked. She has never used smokeless tobacco.      SUBJECTIVE:   Brianna Wesley is a 71 year old female who presents for Preventive Visit.  Chief Complaint   Patient presents with     Wellness Visit     Fasting labs        Patient has been advised of split billing requirements and " "indicates understanding: Yes  Are you in the first 12 months of your Medicare coverage?  No    Healthy Habits:     In general, how would you rate your overall health?  Good    Frequency of exercise:  2-3 days/week    Duration of exercise:  30-45 minutes    Do you usually eat at least 4 servings of fruit and vegetables a day, include whole grains    & fiber and avoid regularly eating high fat or \"junk\" foods?  Yes    Taking medications regularly:  Yes    Medication side effects:  None    Ability to successfully perform activities of daily living:  No assistance needed    Home Safety:  No safety concerns identified    Hearing Impairment:  No hearing concerns    In the past 6 months, have you been bothered by leaking of urine?  No    In general, how would you rate your overall mental or emotional health?  Good      PHQ-2 Total Score: 0    Additional concerns today:  No    Do you feel safe in your environment? Yes    Have you ever done Advance Care Planning? (For example, a Health Directive, POLST, or a discussion with a medical provider or your loved ones about your wishes): Yes, patient states has an Advance Care Planning document and will bring a copy to the clinic.      Fall risk  Fallen 2 or more times in the past year?: No  Any fall with injury in the past year?: No    Cognitive Screening   1) Repeat 3 items (Leader, Season, Table)    2) Clock draw: NORMAL  3) 3 item recall: Recalls 3 objects  Results: 3 items recalled: COGNITIVE IMPAIRMENT LESS LIKELY    Mini-CogTM Copyright S [employer]. Licensed by the author for use in [facility]; reprinted with permission ([email or web address]). All rights reserved.        Reviewed and updated as needed this visit by clinical staff   Tobacco  Allergies  Meds  Problems  Med Hx  Surg Hx  Fam Hx            Reviewed and updated as needed this visit by Provider   Tobacco  Allergies  Meds  Problems  Med Hx  Surg Hx  Fam Hx           Social History     Tobacco Use     " Smoking status: Never Smoker     Smokeless tobacco: Never Used   Substance Use Topics     Alcohol use: Yes     If you drink alcohol do you typically have >3 drinks per day or >7 drinks per week? No    Alcohol Use 2022   Prescreen: >3 drinks/day or >7 drinks/week? -   Prescreen: >3 drinks/day or >7 drinks/week? No       Current providers sharing in care for this patient include:   Patient Care Team:  Bisi Matute MD as PCP - General  Bisi Matute MD as Assigned PCP    The following health maintenance items are reviewed in Epic and correct as of today:  Health Maintenance Due   Topic Date Due     COVID-19 Vaccine (3 - Booster for Nikky series) 2022     BP Readings from Last 3 Encounters:   22 125/73   21 132/67    Wt Readings from Last 3 Encounters:   22 73 kg (161 lb)   21 71.8 kg (158 lb 3.2 oz)   19 76.9 kg (169 lb 9.6 oz)                  Patient Active Problem List   Diagnosis     Osteopenia     Hypercholesteremia     Hip osteoarthritis     Sensorineural hearing loss (SNHL) of left ear with unrestricted hearing of right ear     Tinnitus, left     Family history of factor V deficiency     Past Surgical History:   Procedure Laterality Date     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, ovaries remain     ZZC TOTAL HIP ARTHROPLASTY Right 2018    Procedure: RIGHT TOTAL HIP ARTHROPLASTY;  Surgeon: Victor Hugo Cantrell MD;  Location: [facility] OR;  Service: Orthopedics       Social History     Tobacco Use     Smoking status: Never Smoker     Smokeless tobacco: Never Used   Substance Use Topics     Alcohol use: Yes     Family History   Problem Relation Age of Onset     Dementia Mother 85.00         age 88 with bedsore     Heart Disease Father         alive in his 90s     Breast Cancer No family hx of          Current Outpatient Medications   Medication Sig Dispense Refill     atorvastatin (LIPITOR) 10 MG tablet [ATORVASTATIN (LIPITOR) 10 MG  "TABLET] TAKE 1 TABLET(10 MG) BY MOUTH AT BEDTIME 90 tablet 3     cholecalciferol, vitamin D3, 1,000 unit tablet [CHOLECALCIFEROL, VITAMIN D3, 1,000 UNIT TABLET] Take 2,000 Units by mouth daily.           Review of Systems  Constitutional, HEENT, cardiovascular, pulmonary, gi and gu systems are negative, except as otherwise noted.    OBJECTIVE:   /73   Pulse 78   Resp 16   Ht 1.64 m (5' 4.57\")   Wt 73 kg (161 lb)   BMI 27.15 kg/m   Estimated body mass index is 27.15 kg/m  as calculated from the following:    Height as of this encounter: 1.64 m (5' 4.57\").    Weight as of this encounter: 73 kg (161 lb).  Physical Exam  GENERAL: healthy, alert and no distress  NECK: no adenopathy, no asymmetry, masses, or scars and thyroid normal to palpation  RESP: lungs clear to auscultation - no rales, rhonchi or wheezes  CV: regular rate and rhythm, normal S1 S2, no S3 or S4, no murmur, click or rub, no peripheral edema and peripheral pulses strong  ABDOMEN: soft, nontender, no hepatosplenomegaly, no masses and bowel sounds normal  MS: no gross musculoskeletal defects noted, no edema    Diagnostic Test Results:  Labs reviewed in Epic  Results for orders placed or performed in visit on 05/17/22 (from the past 24 hour(s))   CBC with platelets and differential    Narrative    The following orders were created for panel order CBC with platelets and differential.  Procedure                               Abnormality         Status                     ---------                               -----------         ------                     CBC with platelets and d...[[id number]]                      Final result                 Please view results for these tests on the individual orders.   CBC with platelets and differential   Result Value Ref Range    WBC Count 4.6 4.0 - 11.0 10e3/uL    RBC Count 4.31 3.80 - 5.20 10e6/uL    Hemoglobin 14.1 11.7 - 15.7 g/dL    Hematocrit 41.5 35.0 - 47.0 %    MCV 96 78 - 100 fL    MCH 32.7 26.5 - " 33.0 pg    MCHC 34.0 31.5 - 36.5 g/dL    RDW 12.2 10.0 - 15.0 %    Platelet Count 234 150 - 450 10e3/uL    % Neutrophils 66 %    % Lymphocytes 21 %    % Monocytes 11 %    % Eosinophils 2 %    % Basophils 1 %    % Immature Granulocytes 0 %    Absolute Neutrophils 3.0 1.6 - 8.3 10e3/uL    Absolute Lymphocytes 1.0 0.8 - 5.3 10e3/uL    Absolute Monocytes 0.5 0.0 - 1.3 10e3/uL    Absolute Eosinophils 0.1 0.0 - 0.7 10e3/uL    Absolute Basophils 0.0 0.0 - 0.2 10e3/uL    Absolute Immature Granulocytes 0.0 <=0.4 10e3/uL       Appropriate preventive services were discussed with this patient, including applicable screening as appropriate for cardiovascular disease, diabetes, osteopenia/osteoporosis, and glaucoma.  As appropriate for age/gender, discussed screening for colorectal cancer, prostate cancer, breast cancer, and cervical cancer. Checklist reviewing preventive services available has been given to the patient.    Reviewed patients plan of care and provided an AVS. The Basic Care Plan (routine screening as documented in Health Maintenance) for Brianna meets the Care Plan requirement. This Care Plan has been established and reviewed with the Patient.    Counseling Resources:  ATP IV Guidelines  Pooled Cohorts Equation Calculator  Breast Cancer Risk Calculator  Breast Cancer: Medication to Reduce Risk  FRAX Risk Assessment  ICSI Preventive Guidelines  Dietary Guidelines for Americans, 2010  USDA's MyPlate  ASA Prophylaxis  Lung CA Screening    Bisi Matute MD  [facility]    Identified Health Risks:

## 2022-05-17 NOTE — TELEPHONE ENCOUNTER
Pended fasting lipid panel if appropriate. Please see patient message    Thanks!    Mikey Hays RN     [facility]

## 2022-08-31 ENCOUNTER — TELEPHONE (OUTPATIENT)
Dept: FAMILY MEDICINE | Facility: CLINIC | Age: 72
End: 2022-08-31

## 2022-08-31 DIAGNOSIS — Z23 IMMUNIZATION DUE: Primary | ICD-10-CM

## 2022-08-31 NOTE — TELEPHONE ENCOUNTER
Coming in next week for nurse only visit - #2 Prateek. Need order. Cued up for approval. Please review. Thanks Dr. PERDUE

## 2022-09-06 ENCOUNTER — LAB (OUTPATIENT)
Dept: LAB | Facility: CLINIC | Age: 72
End: 2022-09-06
Payer: MEDICARE

## 2022-09-06 ENCOUNTER — ALLIED HEALTH/NURSE VISIT (OUTPATIENT)
Dept: FAMILY MEDICINE | Facility: CLINIC | Age: 72
End: 2022-09-06
Payer: MEDICARE

## 2022-09-06 DIAGNOSIS — E78.00 HYPERCHOLESTEREMIA: ICD-10-CM

## 2022-09-06 DIAGNOSIS — Z23 IMMUNIZATION DUE: ICD-10-CM

## 2022-09-06 LAB
CHOLEST SERPL-MCNC: 171 MG/DL
HDLC SERPL-MCNC: 49 MG/DL
LDLC SERPL CALC-MCNC: 107 MG/DL
NONHDLC SERPL-MCNC: 122 MG/DL
TRIGL SERPL-MCNC: 74 MG/DL

## 2022-09-06 PROCEDURE — 90471 IMMUNIZATION ADMIN: CPT

## 2022-09-06 PROCEDURE — 36415 COLL VENOUS BLD VENIPUNCTURE: CPT

## 2022-09-06 PROCEDURE — 90750 HZV VACC RECOMBINANT IM: CPT

## 2022-09-06 PROCEDURE — 80061 LIPID PANEL: CPT

## 2022-09-25 ENCOUNTER — HEALTH MAINTENANCE LETTER (OUTPATIENT)
Age: 72
End: 2022-09-25

## 2023-03-31 ENCOUNTER — ANCILLARY PROCEDURE (OUTPATIENT)
Dept: MAMMOGRAPHY | Facility: CLINIC | Age: 73
End: 2023-03-31
Attending: FAMILY MEDICINE
Payer: MEDICARE

## 2023-03-31 DIAGNOSIS — Z12.31 VISIT FOR SCREENING MAMMOGRAM: ICD-10-CM

## 2023-03-31 PROCEDURE — 77067 SCR MAMMO BI INCL CAD: CPT

## 2023-04-20 ENCOUNTER — PATIENT OUTREACH (OUTPATIENT)
Dept: CARE COORDINATION | Facility: CLINIC | Age: 73
End: 2023-04-20
Payer: MEDICARE

## 2023-06-25 ASSESSMENT — ENCOUNTER SYMPTOMS
EYE PAIN: 0
DIZZINESS: 0
HEMATURIA: 0
FEVER: 0
CONSTIPATION: 0
NAUSEA: 0
SHORTNESS OF BREATH: 0
WEAKNESS: 0
COUGH: 0
HEARTBURN: 0
CHILLS: 0
SORE THROAT: 0
PARESTHESIAS: 0
FREQUENCY: 0
JOINT SWELLING: 0
MYALGIAS: 0
HEMATOCHEZIA: 0
HEADACHES: 0
PALPITATIONS: 0
DYSURIA: 0
DIARRHEA: 0
NERVOUS/ANXIOUS: 0
ABDOMINAL PAIN: 0
ARTHRALGIAS: 0

## 2023-06-25 ASSESSMENT — ACTIVITIES OF DAILY LIVING (ADL): CURRENT_FUNCTION: NO ASSISTANCE NEEDED

## 2023-06-30 ENCOUNTER — OFFICE VISIT (OUTPATIENT)
Dept: FAMILY MEDICINE | Facility: CLINIC | Age: 73
End: 2023-06-30
Payer: MEDICARE

## 2023-06-30 VITALS
WEIGHT: 158.8 LBS | HEART RATE: 87 BPM | BODY MASS INDEX: 26.46 KG/M2 | OXYGEN SATURATION: 98 % | HEIGHT: 65 IN | TEMPERATURE: 97.3 F | RESPIRATION RATE: 18 BRPM | DIASTOLIC BLOOD PRESSURE: 69 MMHG | SYSTOLIC BLOOD PRESSURE: 126 MMHG

## 2023-06-30 DIAGNOSIS — E78.00 HYPERCHOLESTEREMIA: ICD-10-CM

## 2023-06-30 DIAGNOSIS — E78.6 LOW HDL (UNDER 40): ICD-10-CM

## 2023-06-30 DIAGNOSIS — Z00.00 ENCOUNTER FOR MEDICARE ANNUAL WELLNESS EXAM: Primary | ICD-10-CM

## 2023-06-30 LAB
ALBUMIN SERPL BCG-MCNC: 4.6 G/DL (ref 3.5–5.2)
ALP SERPL-CCNC: 63 U/L (ref 35–104)
ALT SERPL W P-5'-P-CCNC: 14 U/L (ref 0–50)
ANION GAP SERPL CALCULATED.3IONS-SCNC: 11 MMOL/L (ref 7–15)
AST SERPL W P-5'-P-CCNC: 26 U/L (ref 0–45)
BASOPHILS # BLD AUTO: 0 10E3/UL (ref 0–0.2)
BASOPHILS NFR BLD AUTO: 1 %
BILIRUB SERPL-MCNC: 0.8 MG/DL
BUN SERPL-MCNC: 21.1 MG/DL (ref 8–23)
CALCIUM SERPL-MCNC: 9.8 MG/DL (ref 8.8–10.2)
CHLORIDE SERPL-SCNC: 106 MMOL/L (ref 98–107)
CHOLEST SERPL-MCNC: 180 MG/DL
CREAT SERPL-MCNC: 0.86 MG/DL (ref 0.51–0.95)
DEPRECATED HCO3 PLAS-SCNC: 23 MMOL/L (ref 22–29)
EOSINOPHIL # BLD AUTO: 0.1 10E3/UL (ref 0–0.7)
EOSINOPHIL NFR BLD AUTO: 3 %
ERYTHROCYTE [DISTWIDTH] IN BLOOD BY AUTOMATED COUNT: 12.1 % (ref 10–15)
GFR SERPL CREATININE-BSD FRML MDRD: 72 ML/MIN/1.73M2
GLUCOSE SERPL-MCNC: 94 MG/DL (ref 70–99)
HCT VFR BLD AUTO: 40.9 % (ref 35–47)
HDLC SERPL-MCNC: 53 MG/DL
HGB BLD-MCNC: 14.2 G/DL (ref 11.7–15.7)
IMM GRANULOCYTES # BLD: 0 10E3/UL
IMM GRANULOCYTES NFR BLD: 0 %
LDLC SERPL CALC-MCNC: 114 MG/DL
LYMPHOCYTES # BLD AUTO: 1.2 10E3/UL (ref 0.8–5.3)
LYMPHOCYTES NFR BLD AUTO: 25 %
MCH RBC QN AUTO: 33 PG (ref 26.5–33)
MCHC RBC AUTO-ENTMCNC: 34.7 G/DL (ref 31.5–36.5)
MCV RBC AUTO: 95 FL (ref 78–100)
MONOCYTES # BLD AUTO: 0.5 10E3/UL (ref 0–1.3)
MONOCYTES NFR BLD AUTO: 11 %
NEUTROPHILS # BLD AUTO: 2.8 10E3/UL (ref 1.6–8.3)
NEUTROPHILS NFR BLD AUTO: 61 %
NONHDLC SERPL-MCNC: 127 MG/DL
PLATELET # BLD AUTO: 225 10E3/UL (ref 150–450)
POTASSIUM SERPL-SCNC: 4 MMOL/L (ref 3.4–5.3)
PROT SERPL-MCNC: 7.2 G/DL (ref 6.4–8.3)
RBC # BLD AUTO: 4.3 10E6/UL (ref 3.8–5.2)
SODIUM SERPL-SCNC: 140 MMOL/L (ref 136–145)
TRIGL SERPL-MCNC: 63 MG/DL
WBC # BLD AUTO: 4.7 10E3/UL (ref 4–11)

## 2023-06-30 PROCEDURE — 36415 COLL VENOUS BLD VENIPUNCTURE: CPT | Performed by: FAMILY MEDICINE

## 2023-06-30 PROCEDURE — 99213 OFFICE O/P EST LOW 20 MIN: CPT | Mod: 25 | Performed by: FAMILY MEDICINE

## 2023-06-30 PROCEDURE — 80061 LIPID PANEL: CPT | Performed by: FAMILY MEDICINE

## 2023-06-30 PROCEDURE — 80053 COMPREHEN METABOLIC PANEL: CPT | Performed by: FAMILY MEDICINE

## 2023-06-30 PROCEDURE — 85025 COMPLETE CBC W/AUTO DIFF WBC: CPT | Performed by: FAMILY MEDICINE

## 2023-06-30 PROCEDURE — G0439 PPPS, SUBSEQ VISIT: HCPCS | Performed by: FAMILY MEDICINE

## 2023-06-30 RX ORDER — ATORVASTATIN CALCIUM 10 MG/1
TABLET, FILM COATED ORAL
Qty: 90 TABLET | Refills: 3 | Status: SHIPPED | OUTPATIENT
Start: 2023-06-30 | End: 2024-06-26

## 2023-06-30 ASSESSMENT — ENCOUNTER SYMPTOMS
CHILLS: 0
EYE PAIN: 0
MYALGIAS: 0
SHORTNESS OF BREATH: 0
COUGH: 0
FEVER: 0
NAUSEA: 0
DIARRHEA: 0
ARTHRALGIAS: 0
SORE THROAT: 0
ABDOMINAL PAIN: 0
HEADACHES: 0
JOINT SWELLING: 0
HEMATOCHEZIA: 0
NERVOUS/ANXIOUS: 0
DYSURIA: 0
WEAKNESS: 0
CONSTIPATION: 0
HEARTBURN: 0
PALPITATIONS: 0
FREQUENCY: 0
HEMATURIA: 0
DIZZINESS: 0
PARESTHESIAS: 0

## 2023-06-30 ASSESSMENT — ACTIVITIES OF DAILY LIVING (ADL): CURRENT_FUNCTION: NO ASSISTANCE NEEDED

## 2023-06-30 NOTE — PATIENT INSTRUCTIONS
Patient Education   Personalized Prevention Plan  You are due for the preventive services outlined below.  Your care team is available to assist you in scheduling these services.  If you have already completed any of these items, please share that information with your care team to update in your medical record.  Health Maintenance Due   Topic Date Due    COVID-19 Vaccine (4 - Additional dose for Nikky series) 03/04/2023

## 2023-06-30 NOTE — PROGRESS NOTES
"  ASSESSMENT / PLAN:       ICD-10-CM    1. Encounter for Medicare annual wellness exam  Z00.00 PRIMARY CARE FOLLOW-UP SCHEDULING     CBC with platelets and differential     Comprehensive metabolic panel (BMP + Alb, Alk Phos, ALT, AST, Total. Bili, TP)     CBC with platelets and differential     Comprehensive metabolic panel (BMP + Alb, Alk Phos, ALT, AST, Total. Bili, TP)      2. Hypercholesteremia  E78.00 atorvastatin (LIPITOR) 10 MG tablet     Lipid panel reflex to direct LDL Fasting     Lipid panel reflex to direct LDL Fasting      3. Low HDL (under 40)  E78.6         Patient with hyperlipidemia, low HDL presents today for annual wellness.  She is just come back from a visit from [place].  She is  and stays by herself.  Lab work as above and meanwhile continue Lipitor 10 mg unless lab work shows otherwise.  Patient verbalizes understanding.    COUNSELING:  Reviewed preventive health counseling, as reflected in patient instructions       Regular exercise       Healthy diet/nutrition       Vision screening       Dental care       Osteoporosis prevention/bone health      BMI:   Estimated body mass index is 26.78 kg/m  as calculated from the following:    Height as of this encounter: 1.64 m (5' 4.57\").    Weight as of this encounter: 72 kg (158 lb 12.8 oz).         She reports that she has never smoked. She has never used smokeless tobacco.      Appropriate preventive services were discussed with this patient, including applicable screening as appropriate for cardiovascular disease, diabetes, osteopenia/osteoporosis, and glaucoma.  As appropriate for age/gender, discussed screening for colorectal cancer, prostate cancer, breast cancer, and cervical cancer. Checklist reviewing preventive services available has been given to the patient.    Reviewed patients plan of care and provided an AVS. The Basic Care Plan (routine screening as documented in Health Maintenance) for Brianna meets the Care Plan requirement. " "This Care Plan has been established and reviewed with the Patient.    SUBJECTIVE:   Anni is a 72 year old who presents for Preventive Visit.      6/30/2023     8:25 AM   Additional Questions   Roomed by Sally LOZOYA CMA     Are you in the first 12 months of your Medicare coverage?  No    Healthy Habits:     In general, how would you rate your overall health?  Good    Frequency of exercise:  4-5 days/week    Duration of exercise:  45-60 minutes    Do you usually eat at least 4 servings of fruit and vegetables a day, include whole grains    & fiber and avoid regularly eating high fat or \"junk\" foods?  Yes    Taking medications regularly:  Yes    Medication side effects:  None    Ability to successfully perform activities of daily living:  No assistance needed    Home Safety:  No safety concerns identified    Hearing Impairment:  No hearing concerns    In the past 6 months, have you been bothered by leaking of urine?  No    In general, how would you rate your overall mental or emotional health?  Good    Additional concerns today:  No        Have you ever done Advance Care Planning? (For example, a Health Directive, POLST, or a discussion with a medical provider or your loved ones about your wishes): No, advance care planning information given to patient to review.  Advanced care planning was discussed at today's visit.      Fall risk  Fallen 2 or more times in the past year?: No  Any fall with injury in the past year?: No  click delete button to remove this line now  Cognitive Screening   1) Repeat 3 items (Leader, Season, Table)    2) Clock draw: NORMAL  3) 3 item recall: Recalls 3 objects  Results: 3 items recalled: COGNITIVE IMPAIRMENT LESS LIKELY    Mini-CogTM Copyright BARRINGTON Major. Licensed by the author for use in [facility]; reprinted with permission ([email or web address]). All rights reserved.      Do you have sleep apnea, excessive snoring or daytime drowsiness?: no    Reviewed and updated as needed this visit " by clinical staff   Tobacco  Allergies  Meds              Reviewed and updated as needed this visit by Provider                 Social History     Tobacco Use     Smoking status: Never     Smokeless tobacco: Never   Substance Use Topics     Alcohol use: Yes             6/30/2023     8:33 AM   Alcohol Use   Prescreen: >3 drinks/day or >7 drinks/week? No   AUDIT SCORE  2         6/30/2023     8:33 AM   AUDIT - Alcohol Use Disorders Identification Test - Reproduced from the World Health Organization Audit 2001 (Second Edition)   1.  How often do you have a drink containing alcohol? 2 to 4 times a month   2.  How many drinks containing alcohol do you have on a typical day when you are drinking? 1 or 2   3.  How often do you have five or more drinks on one occasion? Never   4.  How often during the last year have you found that you were not able to stop drinking once you had started? Never   5.  How often during the last year have you failed to do what was normally expected of you because of drinking? Never   6.  How often during the last year have you needed a first drink in the morning to get yourself going after a heavy drinking session? Never   7.  How often during the last year have you had a feeling of guilt or remorse after drinking? Never   8.  How often during the last year have you been unable to remember what happened the night before because of your drinking? Never   9.  Have you or someone else been injured because of your drinking? No   10. Has a relative, friend, doctor or other health care worker been concerned about your drinking or suggested you cut down? No   TOTAL SCORE 2     Do you have a current opioid prescription? No  Do you use any other controlled substances or medications that are not prescribed by a provider? None              Current providers sharing in care for this patient include:   Patient Care Team:  Bisi Matute MD as PCP - General  Bisi Matute MD as Assigned PCP    The  following health maintenance items are reviewed in Epic and correct as of today:  Health Maintenance   Topic Date Due     COVID-19 Vaccine (4 - Additional dose for Nikky series) 2023     MEDICARE ANNUAL WELLNESS VISIT  2024     ANNUAL REVIEW OF HM ORDERS  2024     FALL RISK ASSESSMENT  2024     MAMMO SCREENING  2025     COLORECTAL CANCER SCREENING  2025     LIPID  2027     DTAP/TDAP/TD IMMUNIZATION (3 - Td or Tdap) 2028     ADVANCE CARE PLANNING  2028     DEXA  2036     HEPATITIS C SCREENING  Completed     PHQ-2 (once per calendar year)  Completed     INFLUENZA VACCINE  Completed     Pneumococcal Vaccine: 65+ Years  Completed     ZOSTER IMMUNIZATION  Completed     IPV IMMUNIZATION  Aged Out     MENINGITIS IMMUNIZATION  Aged Out     BP Readings from Last 3 Encounters:   23 126/69   22 125/73   21 132/67    Wt Readings from Last 3 Encounters:   23 72 kg (158 lb 12.8 oz)   22 73 kg (161 lb)   21 71.8 kg (158 lb 3.2 oz)                  Patient Active Problem List   Diagnosis     Osteopenia     Hypercholesteremia     Hip osteoarthritis     Sensorineural hearing loss (SNHL) of left ear with unrestricted hearing of right ear     Tinnitus, left     Family history of factor V deficiency     Past Surgical History:   Procedure Laterality Date     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, ovaries remain     ZZC TOTAL HIP ARTHROPLASTY Right 2018    Procedure: RIGHT TOTAL HIP ARTHROPLASTY;  Surgeon: Victor Hugo Cantrell MD;  Location: [facility] OR;  Service: Orthopedics       Social History     Tobacco Use     Smoking status: Never     Smokeless tobacco: Never   Substance Use Topics     Alcohol use: Yes     Family History   Problem Relation Age of Onset     Dementia Mother 85.00         age 88 with bedsore     Heart Disease Father         alive in his 90s     Breast Cancer No family hx of          Current  "Outpatient Medications   Medication Sig Dispense Refill     atorvastatin (LIPITOR) 10 MG tablet [ATORVASTATIN (LIPITOR) 10 MG TABLET] TAKE 1 TABLET(10 MG) BY MOUTH AT BEDTIME 90 tablet 3     cholecalciferol, vitamin D3, 1,000 unit tablet [CHOLECALCIFEROL, VITAMIN D3, 1,000 UNIT TABLET] Take 2,000 Units by mouth daily.               Review of Systems   Constitutional: Negative for chills and fever.   HENT: Negative for congestion, ear pain, hearing loss and sore throat.    Eyes: Negative for pain and visual disturbance.   Respiratory: Negative for cough and shortness of breath.    Cardiovascular: Negative for chest pain, palpitations and peripheral edema.   Gastrointestinal: Negative for abdominal pain, constipation, diarrhea, heartburn, hematochezia and nausea.   Breasts:  Negative for tenderness and discharge.   Genitourinary: Negative for dysuria, frequency, genital sores, hematuria, pelvic pain, urgency, vaginal bleeding and vaginal discharge.   Musculoskeletal: Negative for arthralgias, joint swelling and myalgias.   Skin: Negative for rash.   Neurological: Negative for dizziness, weakness, headaches and paresthesias.   Psychiatric/Behavioral: Negative for mood changes. The patient is not nervous/anxious.          OBJECTIVE:   /69   Pulse 87   Temp 97.3  F (36.3  C) (Oral)   Resp 18   Ht 1.64 m (5' 4.57\")   Wt 72 kg (158 lb 12.8 oz)   SpO2 98%   BMI 26.78 kg/m   Estimated body mass index is 26.78 kg/m  as calculated from the following:    Height as of this encounter: 1.64 m (5' 4.57\").    Weight as of this encounter: 72 kg (158 lb 12.8 oz).  Physical Exam  GENERAL: healthy, alert and no distress  EYES: Eyes grossly normal to inspection, PERRL and conjunctivae and sclerae normal  HENT: ear canals and TM's normal, nose and mouth without ulcers or lesions  NECK: no adenopathy, no asymmetry, masses, or scars and thyroid normal to palpation  RESP: lungs clear to auscultation - no rales, rhonchi or " wheezes  CV: regular rate and rhythm, normal S1 S2, no S3 or S4, no murmur, click or rub, no peripheral edema and peripheral pulses strong  ABDOMEN: soft, nontender, no hepatosplenomegaly, no masses and bowel sounds normal  MS: no gross musculoskeletal defects noted, no edema    Diagnostic Test Results:  Labs reviewed in Epic  Results for orders placed or performed in visit on 06/30/23 (from the past 24 hour(s))   CBC with platelets and differential    Narrative    The following orders were created for panel order CBC with platelets and differential.  Procedure                               Abnormality         Status                     ---------                               -----------         ------                     CBC with platelets and d...[[id number]]                      Final result                 Please view results for these tests on the individual orders.   CBC with platelets and differential   Result Value Ref Range    WBC Count 4.7 4.0 - 11.0 10e3/uL    RBC Count 4.30 3.80 - 5.20 10e6/uL    Hemoglobin 14.2 11.7 - 15.7 g/dL    Hematocrit 40.9 35.0 - 47.0 %    MCV 95 78 - 100 fL    MCH 33.0 26.5 - 33.0 pg    MCHC 34.7 31.5 - 36.5 g/dL    RDW 12.1 10.0 - 15.0 %    Platelet Count 225 150 - 450 10e3/uL    % Neutrophils 61 %    % Lymphocytes 25 %    % Monocytes 11 %    % Eosinophils 3 %    % Basophils 1 %    % Immature Granulocytes 0 %    Absolute Neutrophils 2.8 1.6 - 8.3 10e3/uL    Absolute Lymphocytes 1.2 0.8 - 5.3 10e3/uL    Absolute Monocytes 0.5 0.0 - 1.3 10e3/uL    Absolute Eosinophils 0.1 0.0 - 0.7 10e3/uL    Absolute Basophils 0.0 0.0 - 0.2 10e3/uL    Absolute Immature Granulocytes 0.0 <=0.4 10e3/uL         Bisi Matute MD  [facility]    Identified Health Risks:    I have reviewed Opioid Use Disorder and Substance Use Disorder risk factors and made any needed referrals.

## 2024-01-23 ENCOUNTER — TRANSFERRED RECORDS (OUTPATIENT)
Dept: HEALTH INFORMATION MANAGEMENT | Facility: CLINIC | Age: 74
End: 2024-01-23
Payer: MEDICARE

## 2024-04-08 ENCOUNTER — ANCILLARY PROCEDURE (OUTPATIENT)
Dept: MAMMOGRAPHY | Facility: CLINIC | Age: 74
End: 2024-04-08
Attending: FAMILY MEDICINE
Payer: MEDICARE

## 2024-04-08 DIAGNOSIS — Z12.31 VISIT FOR SCREENING MAMMOGRAM: ICD-10-CM

## 2024-04-08 PROCEDURE — 77063 BREAST TOMOSYNTHESIS BI: CPT

## 2024-06-26 DIAGNOSIS — E78.00 HYPERCHOLESTEREMIA: ICD-10-CM

## 2024-06-26 RX ORDER — ATORVASTATIN CALCIUM 10 MG/1
TABLET, FILM COATED ORAL
Qty: 90 TABLET | Refills: 3 | Status: SHIPPED | OUTPATIENT
Start: 2024-06-26

## 2024-08-03 SDOH — HEALTH STABILITY: PHYSICAL HEALTH: ON AVERAGE, HOW MANY MINUTES DO YOU ENGAGE IN EXERCISE AT THIS LEVEL?: 50 MIN

## 2024-08-03 SDOH — HEALTH STABILITY: PHYSICAL HEALTH: ON AVERAGE, HOW MANY DAYS PER WEEK DO YOU ENGAGE IN MODERATE TO STRENUOUS EXERCISE (LIKE A BRISK WALK)?: 6 DAYS

## 2024-08-03 ASSESSMENT — SOCIAL DETERMINANTS OF HEALTH (SDOH): HOW OFTEN DO YOU GET TOGETHER WITH FRIENDS OR RELATIVES?: MORE THAN THREE TIMES A WEEK

## 2024-08-08 ENCOUNTER — OFFICE VISIT (OUTPATIENT)
Dept: FAMILY MEDICINE | Facility: CLINIC | Age: 74
End: 2024-08-08
Attending: FAMILY MEDICINE
Payer: MEDICARE

## 2024-08-08 VITALS
HEIGHT: 65 IN | HEART RATE: 79 BPM | WEIGHT: 153.8 LBS | BODY MASS INDEX: 25.62 KG/M2 | DIASTOLIC BLOOD PRESSURE: 75 MMHG | OXYGEN SATURATION: 98 % | RESPIRATION RATE: 16 BRPM | SYSTOLIC BLOOD PRESSURE: 136 MMHG

## 2024-08-08 DIAGNOSIS — E78.00 HYPERCHOLESTEREMIA: ICD-10-CM

## 2024-08-08 DIAGNOSIS — Z00.00 ENCOUNTER FOR MEDICARE ANNUAL WELLNESS EXAM: Primary | ICD-10-CM

## 2024-08-08 DIAGNOSIS — Z78.0 POSTMENOPAUSAL STATUS: ICD-10-CM

## 2024-08-08 LAB
ALBUMIN SERPL BCG-MCNC: 4.5 G/DL (ref 3.5–5.2)
ALP SERPL-CCNC: 60 U/L (ref 40–150)
ALT SERPL W P-5'-P-CCNC: 18 U/L (ref 0–50)
ANION GAP SERPL CALCULATED.3IONS-SCNC: 10 MMOL/L (ref 7–15)
AST SERPL W P-5'-P-CCNC: 26 U/L (ref 0–45)
BASOPHILS # BLD AUTO: 0 10E3/UL (ref 0–0.2)
BASOPHILS NFR BLD AUTO: 1 %
BILIRUB SERPL-MCNC: 0.6 MG/DL
BUN SERPL-MCNC: 16.8 MG/DL (ref 8–23)
CALCIUM SERPL-MCNC: 9.7 MG/DL (ref 8.8–10.4)
CHLORIDE SERPL-SCNC: 106 MMOL/L (ref 98–107)
CHOLEST SERPL-MCNC: 155 MG/DL
CREAT SERPL-MCNC: 0.9 MG/DL (ref 0.51–0.95)
EGFRCR SERPLBLD CKD-EPI 2021: 68 ML/MIN/1.73M2
EOSINOPHIL # BLD AUTO: 0.1 10E3/UL (ref 0–0.7)
EOSINOPHIL NFR BLD AUTO: 2 %
ERYTHROCYTE [DISTWIDTH] IN BLOOD BY AUTOMATED COUNT: 12.7 % (ref 10–15)
FASTING STATUS PATIENT QL REPORTED: YES
FASTING STATUS PATIENT QL REPORTED: YES
GLUCOSE SERPL-MCNC: 94 MG/DL (ref 70–99)
HCO3 SERPL-SCNC: 25 MMOL/L (ref 22–29)
HCT VFR BLD AUTO: 41.5 % (ref 35–47)
HDLC SERPL-MCNC: 49 MG/DL
HGB BLD-MCNC: 14.1 G/DL (ref 11.7–15.7)
IMM GRANULOCYTES # BLD: 0 10E3/UL
IMM GRANULOCYTES NFR BLD: 0 %
LDLC SERPL CALC-MCNC: 94 MG/DL
LYMPHOCYTES # BLD AUTO: 1.1 10E3/UL (ref 0.8–5.3)
LYMPHOCYTES NFR BLD AUTO: 27 %
MCH RBC QN AUTO: 32.7 PG (ref 26.5–33)
MCHC RBC AUTO-ENTMCNC: 34 G/DL (ref 31.5–36.5)
MCV RBC AUTO: 96 FL (ref 78–100)
MONOCYTES # BLD AUTO: 0.5 10E3/UL (ref 0–1.3)
MONOCYTES NFR BLD AUTO: 11 %
NEUTROPHILS # BLD AUTO: 2.5 10E3/UL (ref 1.6–8.3)
NEUTROPHILS NFR BLD AUTO: 60 %
NONHDLC SERPL-MCNC: 106 MG/DL
PLATELET # BLD AUTO: 223 10E3/UL (ref 150–450)
POTASSIUM SERPL-SCNC: 4.5 MMOL/L (ref 3.4–5.3)
PROT SERPL-MCNC: 7.4 G/DL (ref 6.4–8.3)
RBC # BLD AUTO: 4.31 10E6/UL (ref 3.8–5.2)
SODIUM SERPL-SCNC: 141 MMOL/L (ref 135–145)
TRIGL SERPL-MCNC: 58 MG/DL
WBC # BLD AUTO: 4.1 10E3/UL (ref 4–11)

## 2024-08-08 PROCEDURE — 80061 LIPID PANEL: CPT | Performed by: FAMILY MEDICINE

## 2024-08-08 PROCEDURE — 80053 COMPREHEN METABOLIC PANEL: CPT | Performed by: FAMILY MEDICINE

## 2024-08-08 PROCEDURE — 85025 COMPLETE CBC W/AUTO DIFF WBC: CPT | Performed by: FAMILY MEDICINE

## 2024-08-08 PROCEDURE — 36415 COLL VENOUS BLD VENIPUNCTURE: CPT | Performed by: FAMILY MEDICINE

## 2024-08-08 PROCEDURE — G0439 PPPS, SUBSEQ VISIT: HCPCS | Performed by: FAMILY MEDICINE

## 2024-08-08 PROCEDURE — 99213 OFFICE O/P EST LOW 20 MIN: CPT | Mod: 25 | Performed by: FAMILY MEDICINE

## 2024-08-08 RX ORDER — VIT C/B6/B5/MAGNESIUM/HERB 173 50-5-6-5MG
CAPSULE ORAL
COMMUNITY

## 2024-08-08 NOTE — PROGRESS NOTES
"Preventive Care Visit  [facility]  Bisi Matute MD, Family Medicine  Aug 8, 2024      Assessment & Plan     ICD-10-CM    1. Encounter for Medicare annual wellness exam  Z00.00 PRIMARY CARE FOLLOW-UP SCHEDULING     Comprehensive metabolic panel (BMP + Alb, Alk Phos, ALT, AST, Total. Bili, TP)     CBC with platelets and differential     Comprehensive metabolic panel (BMP + Alb, Alk Phos, ALT, AST, Total. Bili, TP)     CBC with platelets and differential      2. Hypercholesteremia  E78.00 Lipid panel reflex to direct LDL Fasting     Lipid panel reflex to direct LDL Fasting      3. Postmenopausal status  Z78.0 DX Bone Density        Patient today for annual wellness exam.  She has been exercising with walking daily and would like to see how it has benefited her cholesterol profile.  She does have essential hyperlipidemia and is on 10 mg of atorvastatin with suboptimal control.  Discussed increase to 20 mg if LDL still remains high.  Patient is agreeable.  Health maintenance labs as above.    BMI  Estimated body mass index is 25.99 kg/m  as calculated from the following:    Height as of this encounter: 1.638 m (5' 4.5\").    Weight as of this encounter: 69.8 kg (153 lb 12.8 oz).       Counseling  Appropriate preventive services were addressed with this patient via screening, questionnaire, or discussion as appropriate for fall prevention, nutrition, physical activity, Tobacco-use cessation, weight loss and cognition.  Checklist reviewing preventive services available has been given to the patient.  Reviewed patient's diet, addressing concerns and/or questions.   She is at risk for psychosocial distress and has been provided with information to reduce risk.       MEDICATIONS:  Continue current medications without change  Regular exercise  See Patient Instructions    Jaye Hutton is a 73 year old, presenting for the following:  Wellness Visit (Pt is fasting today, no concerns. )  She " lives independently.  She visits her daughter and son-in-law who are both professors at [employer].  She has a 9-year-old granddaughter.  She likes traveling.  She is planning to sell her house in future.        8/8/2024     7:42 AM   Additional Questions   Roomed by Audrey Bowser CMA         Health Care Directive  Patient does not have a Health Care Directive or Living Will: Patient states has Advance Directive and will bring in a copy to clinic.    HPI          8/3/2024   General Health   How would you rate your overall physical health? Good   Feel stress (tense, anxious, or unable to sleep) Only a little      (!) STRESS CONCERN      8/3/2024   Nutrition   Diet: Regular (no restrictions)            8/3/2024   Exercise   Days per week of moderate/strenous exercise 6 days   Average minutes spent exercising at this level 50 min            8/3/2024   Social Factors   Frequency of gathering with friends or relatives More than three times a week   Worry food won't last until get money to buy more No   Food not last or not have enough money for food? No   Do you have housing? (Housing is defined as stable permanent housing and does not include staying ouside in a car, in a tent, in an abandoned building, in an overnight shelter, or couch-surfing.) Yes   Are you worried about losing your housing? No   Lack of transportation? No   Unable to get utilities (heat,electricity)? No            8/3/2024   Fall Risk   Fallen 2 or more times in the past year? No    No   Trouble with walking or balance? No    No       Multiple values from one day are sorted in reverse-chronological order          8/3/2024   Activities of Daily Living- Home Safety   Needs help with the following daily activites None of the above   Safety concerns in the home None of the above            8/3/2024   Dental   Dentist two times every year? Yes            8/3/2024   Hearing Screening   Hearing concerns? None of the above            8/3/2024   Driving Risk  Screening   Patient/family members have concerns about driving No            8/3/2024   General Alertness/Fatigue Screening   Have you been more tired than usual lately? No            8/3/2024   Urinary Incontinence Screening   Bothered by leaking urine in past 6 months No            8/3/2024   TB Screening   Were you born outside of the [place]? No            Today's PHQ-2 Score:       8/7/2024    12:25 PM   PHQ-2 ( 1999 [employer])   Q1: Little interest or pleasure in doing things 0   Q2: Feeling down, depressed or hopeless 0   PHQ-2 Score 0   Q1: Little interest or pleasure in doing things Not at all   Q2: Feeling down, depressed or hopeless Not at all   PHQ-2 Score 0           8/3/2024   Substance Use   Alcohol more than 3/day or more than 7/wk No   Do you have a current opioid prescription? No   How severe/bad is pain from 1 to 10? 1/10   Do you use any other substances recreationally? No        Social History     Tobacco Use    Smoking status: Never    Smokeless tobacco: Never   Substance Use Topics    Alcohol use: Yes    Drug use: No           4/8/2024   LAST FHS-7 RESULTS   1st degree relative breast or ovarian cancer No   Any relative bilateral breast cancer No   Any male have breast cancer No   Any ONE woman have BOTH breast AND ovarian cancer No   Any woman with breast cancer before 50yrs No   2 or more relatives with breast AND/OR ovarian cancer No   2 or more relatives with breast AND/OR bowel cancer No           Mammogram Screening - Mammogram every 1-2 years updated in Health Maintenance based on mutual decision making    ASCVD Risk   The 10-year ASCVD risk score (Trudy REYES, et al., 2019) is: 13.4%    Values used to calculate the score:      Age: 73 years      Sex: Female      Is Non- : No      Diabetic: No      Tobacco smoker: No      Systolic Blood Pressure: 139 mmHg      Is BP treated: No      HDL Cholesterol: 53 mg/dL      Total Cholesterol: 180 mg/dL            Reviewed  and updated as needed this visit by Provider   Tobacco  Allergies  Meds  Problems  Med Hx  Surg Hx  Fam Hx            Past Medical History:   Diagnosis Date    Hypercholesteremia     Osteopenia     Uterine carcinoma (H)     in situ     Past Surgical History:   Procedure Laterality Date    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, ovaries remain    ZZC TOTAL HIP ARTHROPLASTY Right 2018    Procedure: RIGHT TOTAL HIP ARTHROPLASTY;  Surgeon: Victor Hugo Cantrell MD;  Location: [facility];  Service: Orthopedics     BP Readings from Last 3 Encounters:   24 139/66   23 126/69   22 125/73    Wt Readings from Last 3 Encounters:   24 69.8 kg (153 lb 12.8 oz)   23 72 kg (158 lb 12.8 oz)   22 73 kg (161 lb)                  Patient Active Problem List   Diagnosis    Osteopenia    Hypercholesteremia    Hip osteoarthritis    Sensorineural hearing loss (SNHL) of left ear with unrestricted hearing of right ear    Tinnitus, left    Family history of factor V deficiency     Past Surgical History:   Procedure Laterality Date    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, ovaries remain    [facility] TOTAL HIP ARTHROPLASTY Right 2018    Procedure: RIGHT TOTAL HIP ARTHROPLASTY;  Surgeon: Victor Hugo Cantrell MD;  Location: [facility];  Service: Orthopedics       Social History     Tobacco Use    Smoking status: Never    Smokeless tobacco: Never   Substance Use Topics    Alcohol use: Yes     Family History   Problem Relation Age of Onset    Dementia Mother 85.00         age 88 with bedsore    Heart Disease Father         alive in his 90s    Breast Cancer No family hx of          Current Outpatient Medications   Medication Sig Dispense Refill    atorvastatin (LIPITOR) 10 MG tablet [ATORVASTATIN (LIPITOR) 10 MG TABLET] TAKE 1 TABLET(10 MG) BY MOUTH AT BEDTIME 90 tablet 3    cholecalciferol, vitamin D3, 1,000 unit tablet [CHOLECALCIFEROL, VITAMIN D3, 1,000 UNIT  "TABLET] Take 2,000 Units by mouth daily.      Turmeric 500 MG CAPS        Current providers sharing in care for this patient include:  Patient Care Team:  Bisi Matute MD as PCP - General  Bisi Matute MD as Assigned PCP    The following health maintenance items are reviewed in Epic and correct as of today:  Health Maintenance   Topic Date Due    COVID-19 Vaccine (5 - 2023-24 season) 02/08/2024    LIPID  06/30/2024    INFLUENZA VACCINE (1) 09/01/2024    COLORECTAL CANCER SCREENING  06/19/2025    MEDICARE ANNUAL WELLNESS VISIT  08/08/2025    ANNUAL REVIEW OF HM ORDERS  08/08/2025    FALL RISK ASSESSMENT  08/08/2025    MAMMO SCREENING  04/08/2026    GLUCOSE  06/30/2026    DTAP/TDAP/TD IMMUNIZATION (3 - Td or Tdap) 01/02/2028    ADVANCE CARE PLANNING  06/30/2028    DEXA  12/02/2036    HEPATITIS C SCREENING  Completed    PHQ-2 (once per calendar year)  Completed    Pneumococcal Vaccine: 65+ Years  Completed    ZOSTER IMMUNIZATION  Completed    RSV VACCINE (Pregnancy & 60+)  Completed    IPV IMMUNIZATION  Aged Out    HPV IMMUNIZATION  Aged Out    MENINGITIS IMMUNIZATION  Aged Out    RSV MONOCLONAL ANTIBODY  Aged Out         Review of Systems  Constitutional, HEENT, cardiovascular, pulmonary, GI, , musculoskeletal, neuro, skin, endocrine and psych systems are negative, except as otherwise noted.     Objective    Exam  /66 (BP Location: Left arm, Patient Position: Sitting, Cuff Size: Adult Large)   Pulse 79   Resp 16   Ht 1.638 m (5' 4.5\")   Wt 69.8 kg (153 lb 12.8 oz)   SpO2 98%   BMI 25.99 kg/m     Estimated body mass index is 25.99 kg/m  as calculated from the following:    Height as of this encounter: 1.638 m (5' 4.5\").    Weight as of this encounter: 69.8 kg (153 lb 12.8 oz).    Physical Exam  GENERAL: alert and no distress  EYES: Eyes grossly normal to inspection, PERRL and conjunctivae and sclerae normal  HENT: ear canals and TM's normal, nose and mouth without ulcers or lesions  NECK: no " adenopathy, no asymmetry, masses, or scars  RESP: lungs clear to auscultation - no rales, rhonchi or wheezes  CV: regular rate and rhythm, normal S1 S2, no S3 or S4, no murmur, click or rub, no peripheral edema  ABDOMEN: soft, nontender, no hepatosplenomegaly, no masses and bowel sounds normal  MS: no gross musculoskeletal defects noted, no edema        8/8/2024   Mini Cog   Clock Draw Score 2 Normal   3 Item Recall 3 objects recalled   Mini Cog Total Score 5                 Signed Electronically by: Bisi Matute MD

## 2024-08-08 NOTE — PATIENT INSTRUCTIONS
Patient Education   Preventive Care Advice   This is general advice given by our system to help you stay healthy. However, your care team may have specific advice just for you. Please talk to your care team about your preventive care needs.  Nutrition  Eat 5 or more servings of fruits and vegetables each day.  Try wheat bread, brown rice and whole grain pasta (instead of white bread, rice, and pasta).  Get enough calcium and vitamin D. Check the label on foods and aim for 100% of the RDA (recommended daily allowance).  Lifestyle  Exercise at least 150 minutes each week  (30 minutes a day, 5 days a week).  Do muscle strengthening activities 2 days a week. These help control your weight and prevent disease.  No smoking.  Wear sunscreen to prevent skin cancer.  Have a dental exam and cleaning every 6 months.  Yearly exams  See your health care team every year to talk about:  Any changes in your health.  Any medicines your care team has prescribed.  Preventive care, family planning, and ways to prevent chronic diseases.  Shots (vaccines)   HPV shots (up to age 26), if you've never had them before.  Hepatitis B shots (up to age 59), if you've never had them before.  COVID-19 shot: Get this shot when it's due.  Flu shot: Get a flu shot every year.  Tetanus shot: Get a tetanus shot every 10 years.  Pneumococcal, hepatitis A, and RSV shots: Ask your care team if you need these based on your risk.  Shingles shot (for age 50 and up)  General health tests  Diabetes screening:  Starting at age 35, Get screened for diabetes at least every 3 years.  If you are younger than age 35, ask your care team if you should be screened for diabetes.  Cholesterol test: At age 39, start having a cholesterol test every 5 years, or more often if advised.  Bone density scan (DEXA): At age 50, ask your care team if you should have this scan for osteoporosis (brittle bones).  Hepatitis C: Get tested at least once in your life.  STIs (sexually  transmitted infections)  Before age 24: Ask your care team if you should be screened for STIs.  After age 24: Get screened for STIs if you're at risk. You are at risk for STIs (including HIV) if:  You are sexually active with more than one person.  You don't use condoms every time.  You or a partner was diagnosed with a sexually transmitted infection.  If you are at risk for HIV, ask about PrEP medicine to prevent HIV.  Get tested for HIV at least once in your life, whether you are at risk for HIV or not.  Cancer screening tests  Cervical cancer screening: If you have a cervix, begin getting regular cervical cancer screening tests starting at age 21.  Breast cancer scan (mammogram): If you've ever had breasts, begin having regular mammograms starting at age 40. This is a scan to check for breast cancer.  Colon cancer screening: It is important to start screening for colon cancer at age 45.  Have a colonoscopy test every 10 years (or more often if you're at risk) Or, ask your provider about stool tests like a FIT test every year or Cologuard test every 3 years.  To learn more about your testing options, visit:   .  For help making a decision, visit:   https://bit.ly/ys17202.  Prostate cancer screening test: If you have a prostate, ask your care team if a prostate cancer screening test (PSA) at age 55 is right for you.  Lung cancer screening: If you are a current or former smoker ages 50 to 80, ask your care team if ongoing lung cancer screenings are right for you.  For informational purposes only. Not to replace the advice of your health care provider. Copyright   2023 [employer]. All rights reserved. Clinically reviewed by the [facility]. [employer] [id number] - REV 01/24.

## 2024-12-17 ENCOUNTER — OFFICE VISIT (OUTPATIENT)
Dept: FAMILY MEDICINE | Facility: CLINIC | Age: 74
End: 2024-12-17
Payer: MEDICARE

## 2024-12-17 VITALS
HEART RATE: 67 BPM | HEIGHT: 65 IN | BODY MASS INDEX: 26.16 KG/M2 | DIASTOLIC BLOOD PRESSURE: 70 MMHG | OXYGEN SATURATION: 98 % | WEIGHT: 157 LBS | SYSTOLIC BLOOD PRESSURE: 120 MMHG | RESPIRATION RATE: 16 BRPM

## 2024-12-17 DIAGNOSIS — E78.00 HYPERCHOLESTEREMIA: ICD-10-CM

## 2024-12-17 DIAGNOSIS — Z78.0 POSTMENOPAUSAL STATUS: ICD-10-CM

## 2024-12-17 DIAGNOSIS — Z01.818 PREOP GENERAL PHYSICAL EXAM: Primary | ICD-10-CM

## 2024-12-17 DIAGNOSIS — R03.0 WHITE COAT SYNDROME WITHOUT HYPERTENSION: ICD-10-CM

## 2024-12-17 PROCEDURE — G2211 COMPLEX E/M VISIT ADD ON: HCPCS | Performed by: FAMILY MEDICINE

## 2024-12-17 PROCEDURE — 99214 OFFICE O/P EST MOD 30 MIN: CPT | Performed by: FAMILY MEDICINE

## 2024-12-17 NOTE — PATIENT INSTRUCTIONS
How to Take Your Medication Before Surgery  Preoperative Medication Instructions   Antiplatelet or Anticoagulation Medication Instructions   - Patient is on no antiplatelet or anticoagulation medications.    Additional Medication Instructions  Take all scheduled medications on the day of surgery EXCEPT for modifications listed below:   - Statins: Continue taking on the day of surgery.    - Herbal medications and vitamins: DO NOT TAKE 14 days prior to surgery.       Patient Education   Preparing for Your Surgery  For Adults  Getting started  In most cases, a nurse will call to review your health history and instructions. They will give you an arrival time based on your scheduled surgery time. Please be ready to share:  Your doctor's clinic name and phone number  Your medical, surgical, and anesthesia history  A list of allergies and sensitivities  A list of medicines, including herbal treatments and over-the-counter drugs  Whether the patient has a legal guardian (ask how to send us the papers in advance)  Note: You may not receive a call if you were seen at our PAC (Preoperative Assessment Center).  Please tell us if you're pregnant--or if there's any chance you might be pregnant. Some surgeries may injure a fetus (unborn baby), so they require a pregnancy test. Surgeries that are safe for a fetus don't always need a test, and you can choose whether to have one.   Preparing for surgery  Within 10 to 30 days of surgery: Have a pre-op exam (sometimes called an H&P, or History and Physical). This can be done at a clinic or pre-operative center.  If you're having a , you may not need this exam. Talk to your care team.  At your pre-op exam, talk to your care team about all medicines you take. (This includes CBD oil and any drugs, such as THC, marijuana, and other forms of cannabis.) If you need to stop any medicine before surgery, ask when to start taking it again.  This is for your safety. Many medicines and  drugs can make you bleed too much during surgery. Some change how well surgery (anesthesia) drugs work.  Call your insurance company to let them know you're having surgery. (If you don't have insurance, call [phone].)  Call your clinic if there's any change in your health. This includes a scrape or scratch near the surgery site, or any signs of a cold (sore throat, runny nose, cough, rash, fever).  Eating and drinking guidelines  For your safety: Unless your surgeon tells you otherwise, follow the guidelines below.  Eat and drink as normal until 8 hours before you arrive for surgery. After that, no food or milk. You can spit out gum when you arrive.  Drink clear liquids until 2 hours before you arrive. These are liquids you can see through, like water, Gatorade, and Propel Water. They also include plain black coffee and tea (no cream or milk).  No alcohol for 24 hours before you arrive. The night before surgery, stop any drinks that contain THC.  If your care team tells you to take medicine on the morning of surgery, it's okay to take it with a sip of water. No other medicines or drugs are allowed (including CBD oil)--follow your care team's instructions.  If you have questions the day of surgery, call your hospital or surgery center.   Preventing infection  Shower or bathe the night before and the morning of surgery. Follow the instructions your clinic gave you. (If no instructions, use regular soap.)  Don't shave or clip hair near your surgery site. We'll remove the hair if needed.  Don't smoke or vape the morning of surgery. No chewing tobacco for 6 hours before you arrive. A nicotine patch is okay. You may spit out nicotine gum when you arrive.  For some surgeries, the surgeon will tell you to fully quit smoking and nicotine.  We will make every effort to keep you safe from infection. We will:  Clean our hands often with soap and water (or an alcohol-based hand rub).  Clean the skin at your surgery site  with a special soap that kills germs.  Give you a special gown to keep you warm. (Cold raises the risk of infection.)  Wear hair covers, masks, gowns, and gloves during surgery.  Give antibiotic medicine, if prescribed. Not all surgeries need this medicine.  What to bring on the day of surgery  Photo ID and insurance card  Copy of your health care directive, if you have one  Glasses and hearing aids (bring cases)  You can't wear contacts during surgery  Inhaler and eye drops, if you use them (tell us about these when you arrive)  CPAP machine or breathing device, if you use them  A few personal items, if spending the night  If you have . . .  A pacemaker, ICD (cardiac defibrillator), or other implant: Bring the ID card.  An implanted stimulator: Bring the remote control.  A legal guardian: Bring a copy of the certified (court-stamped) guardianship papers.  Please remove any jewelry, including body piercings. Leave jewelry and other valuables at home.  If you're going home the day of surgery  You must have a responsible adult drive you home. They should stay with you overnight as well.  If you don't have someone to stay with you, and you aren't safe to go home alone, we may keep you overnight. Insurance often won't pay for this.  After surgery  If it's hard to control your pain or you need more pain medicine, please call your surgeon's office.  Questions?   If you have any questions for your care team, list them here:   ____________________________________________________________________________________________________________________________________________________________________________________________________________________________________________________________  For informational purposes only. Not to replace the advice of your health care provider. Copyright   2003, 2019 [facility]. All rights reserved. Clinically reviewed by Giovanni Dent MD. [employer] [id number] - REV 08/24.

## 2024-12-17 NOTE — PROGRESS NOTES
Preoperative Evaluation  [facility]  [address] [facility] [address]  Phone: [phone]  Fax: [phone]  Primary Provider: Bisi Matute MD  Pre-op Performing Provider: Bisi Matute MD  Dec 17, 2024             12/12/2024   Surgical Information   What procedure is being done? Pre-op for cataract surgeries    Facility or Hospital where procedure/surgery will be performed: [facility], [place], [place]    Who is doing the procedure / surgery? Dr. Juana Donis    Date of surgery / procedure: 1/13/2025 and 1/31/2025    Time of surgery / procedure: Not yet scheduled    Where do you plan to recover after surgery? at home alone        Patient-reported     Fax number for surgical facility: [phone]    Assessment & Plan     ICD-10-CM    1. Preop general physical exam  Z01.818       2. Postmenopausal status  Z78.0 DX Bone Density      3. Hypercholesteremia  E78.00       4. White coat syndrome without hypertension  R03.0           The proposed surgical procedure is considered LOW risk.         - No identified additional risk factors other than previously addressed    Preoperative Medication Instructions  Antiplatelet or Anticoagulation Medication Instructions   - Patient is on no antiplatelet or anticoagulation medications.    Additional Medication Instructions  Take all scheduled medications on the day of surgery EXCEPT for modifications listed below:   - Statins: Continue taking on the day of surgery.    - Herbal medications and vitamins: DO NOT TAKE 14 days prior to surgery.    Recommendation  Approval given to proceed with proposed procedure, without further diagnostic evaluation.    1: Hyperlipidemia: Tolerating statin well.  Continue  2: Osteopenia: Last DEXA scan had shown worsening osteopenia.  Recommendation was to recheck in 2 years time.  DEXA scan ordered.  3: Whitecoat syndrome: Initial blood pressure was fairly high.  Repeat check shows  normotensive range.  No medication at this point.    Jaye Hutton is a 74 year old, presenting for the following:  Pre-Op Exam (DOS 01/13/2025, right eye cataract, 01/31/2025- left eye cataract. @ [facility] in [place], with Dr. Donis. )          12/17/2024    10:42 AM   Additional Questions   Roomed by ALEX Fitzpatrick related to upcoming procedure: Undergoing cataract surgery        12/12/2024   Pre-Op Questionnaire   Have you ever had a heart attack or stroke? No    Have you ever had surgery on your heart or blood vessels, such as a stent placement, a coronary artery bypass, or surgery on an artery in your head, neck, heart, or legs? No    Do you have chest pain with activity? No    Do you have a history of heart failure? No    Do you currently have a cold, bronchitis or symptoms of other infection? No    Do you have a cough, shortness of breath, or wheezing? No    Do you or anyone in your family have previous history of blood clots? (!) YES - No personal history    Do you or does anyone in your family have a serious bleeding problem such as prolonged bleeding following surgeries or cuts? No    Have you ever had problems with anemia or been told to take iron pills? No    Have you had any abnormal blood loss such as black, tarry or bloody stools, or abnormal vaginal bleeding? No    Have you ever had a blood transfusion? No    Are you willing to have a blood transfusion if it is medically needed before, during, or after your surgery? Yes    Have you or any of your relatives ever had problems with anesthesia? No    Do you have sleep apnea, excessive snoring or daytime drowsiness? No    Do you have any artifical heart valves or other implanted medical devices like a pacemaker, defibrillator, or continuous glucose monitor? No    Do you have artificial joints? (!) YES    Are you allergic to latex? No        Patient-reported     Health Care Directive  Patient does not have a Health Care  Directive: Discussed advance care planning with patient; however, patient declined at this time.    Preoperative Review of    reviewed - no record of controlled substances prescribed.      Status of Chronic Conditions:  See problem list for active medical problems.  Problems all longstanding and stable, except as noted/documented.  See ROS for pertinent symptoms related to these conditions.    Patient Active Problem List    Diagnosis Date Noted    Family history of factor V deficiency 05/17/2022     Priority: Medium    Sensorineural hearing loss (SNHL) of left ear with unrestricted hearing of right ear 03/02/2021     Priority: Medium    Tinnitus, left 03/02/2021     Priority: Medium    Hip osteoarthritis 01/12/2018     Priority: Medium    Hypercholesteremia      Priority: Medium     Created by Conversion        Osteopenia      Priority: Medium     Created by Conversion  Replacement Utility updated for latest IMO load          Past Medical History:   Diagnosis Date    Hypercholesteremia     Osteopenia     Uterine carcinoma (H) 1985    in situ     Past Surgical History:   Procedure Laterality Date    COLONOSCOPY      HYSTERECTOMY  01/01/1985 1986 for carcinoma insitu, ovaries remain    ZZC TOTAL HIP ARTHROPLASTY Right 01/12/2018    Procedure: RIGHT TOTAL HIP ARTHROPLASTY;  Surgeon: Victor Hugo Cantrell MD;  Location: [facility];  Service: Orthopedics     Current Outpatient Medications   Medication Sig Dispense Refill    atorvastatin (LIPITOR) 10 MG tablet [ATORVASTATIN (LIPITOR) 10 MG TABLET] TAKE 1 TABLET(10 MG) BY MOUTH AT BEDTIME 90 tablet 3    cholecalciferol, vitamin D3, 1,000 unit tablet [CHOLECALCIFEROL, VITAMIN D3, 1,000 UNIT TABLET] Take 2,000 Units by mouth daily.      Turmeric 500 MG CAPS          No Known Allergies     Social History     Tobacco Use    Smoking status: Never    Smokeless tobacco: Never   Substance Use Topics    Alcohol use: Yes     Family History   Problem Relation Age of  "Onset    Dementia Mother 85         age 88 with bedsore    Osteoporosis Mother     Heart Disease Father         alive in his 90s    Coronary Artery Disease Father     Hypertension Father     Cerebrovascular Disease Father     Breast Cancer No family hx of      History   Drug Use No             Review of Systems  Constitutional, neuro, ENT, endocrine, pulmonary, cardiac, gastrointestinal, genitourinary, musculoskeletal, integument and psychiatric systems are negative, except as otherwise noted.    Objective    /70   Pulse 67   Resp 16   Ht 1.638 m (5' 4.5\")   Wt 71.2 kg (157 lb)   SpO2 98%   BMI 26.53 kg/m     Estimated body mass index is 26.53 kg/m  as calculated from the following:    Height as of this encounter: 1.638 m (5' 4.5\").    Weight as of this encounter: 71.2 kg (157 lb).  Physical Exam  GENERAL: alert and no distress  EYES: Eyes grossly normal to inspection, PERRL and conjunctivae and sclerae normal  HENT: ear canals and TM's normal, nose and mouth without ulcers or lesions  NECK: no adenopathy, no asymmetry, masses, or scars  RESP: lungs clear to auscultation - no rales, rhonchi or wheezes  CV: regular rate and rhythm, normal S1 S2, no S3 or S4, no murmur, click or rub, no peripheral edema  ABDOMEN: soft, nontender, no hepatosplenomegaly, no masses and bowel sounds normal  MS: no gross musculoskeletal defects noted, no edema    Recent Labs   Lab Test 24  0842   HGB 14.1         POTASSIUM 4.5   CR 0.90        Diagnostics  No labs were ordered during this visit.   No EKG required for low risk surgery (cataract, skin procedure, breast biopsy, etc).    Revised Cardiac Risk Index (RCRI)  The patient has the following serious cardiovascular risks for perioperative complications:   - No serious cardiac risks = 0 points     RCRI Interpretation: 0 points: Class I (very low risk - 0.4% complication rate)         Signed Electronically by: Bisi Matute MD  A copy of this " evaluation report is provided to the requesting physician.

## 2025-03-25 ENCOUNTER — ANCILLARY ORDERS (OUTPATIENT)
Dept: MAMMOGRAPHY | Facility: CLINIC | Age: 75
End: 2025-03-25
Payer: MEDICARE

## 2025-03-25 DIAGNOSIS — Z12.31 VISIT FOR SCREENING MAMMOGRAM: Primary | ICD-10-CM

## 2025-04-28 ENCOUNTER — ANCILLARY PROCEDURE (OUTPATIENT)
Dept: MAMMOGRAPHY | Facility: CLINIC | Age: 75
End: 2025-04-28
Attending: FAMILY MEDICINE
Payer: MEDICARE

## 2025-04-28 DIAGNOSIS — Z12.31 VISIT FOR SCREENING MAMMOGRAM: ICD-10-CM

## 2025-04-28 PROCEDURE — 77067 SCR MAMMO BI INCL CAD: CPT

## 2025-05-06 ENCOUNTER — OFFICE VISIT (OUTPATIENT)
Dept: FAMILY MEDICINE | Facility: CLINIC | Age: 75
End: 2025-05-06
Payer: COMMERCIAL

## 2025-05-06 VITALS
WEIGHT: 158 LBS | RESPIRATION RATE: 20 BRPM | DIASTOLIC BLOOD PRESSURE: 56 MMHG | HEIGHT: 65 IN | OXYGEN SATURATION: 97 % | SYSTOLIC BLOOD PRESSURE: 137 MMHG | HEART RATE: 60 BPM | BODY MASS INDEX: 26.33 KG/M2 | TEMPERATURE: 98.2 F

## 2025-05-06 DIAGNOSIS — H93.A1 PULSATILE TINNITUS OF RIGHT EAR: Primary | ICD-10-CM

## 2025-05-06 DIAGNOSIS — H93.12 TINNITUS, LEFT: ICD-10-CM

## 2025-05-06 DIAGNOSIS — E78.00 HYPERCHOLESTEREMIA: ICD-10-CM

## 2025-05-06 PROCEDURE — 3078F DIAST BP <80 MM HG: CPT | Performed by: FAMILY MEDICINE

## 2025-05-06 PROCEDURE — G2211 COMPLEX E/M VISIT ADD ON: HCPCS | Performed by: FAMILY MEDICINE

## 2025-05-06 PROCEDURE — 99213 OFFICE O/P EST LOW 20 MIN: CPT | Performed by: FAMILY MEDICINE

## 2025-05-06 PROCEDURE — 3075F SYST BP GE 130 - 139MM HG: CPT | Performed by: FAMILY MEDICINE

## 2025-05-06 NOTE — PROGRESS NOTES
"  Assessment & Plan     ICD-10-CM    1. Pulsatile tinnitus of right ear  H93.A1 Adult Audiology  Referral     Adult ENT  Referral      2. Tinnitus, left  H93.12 Adult Audiology  Referral     Adult ENT  Referral      3. Hypercholesteremia  E78.00         Patient with tinnitus of the left ear and poor hearing.  Was recommended hearing aids and follow-up in ENT back in 2022.  Appears this has not been pursued.  Now for the last 1 month she has been having pulsatile tinnitus of the right ear.  Repeat audiology and follow-up with ENT.  Due to unilateral pulsatile tinnitus, discussed I can order MRI to rule out vascular cause if ENT appointment is taking a long time.  She does continue to take statin for hypercholesterolemia.    The longitudinal plan of care for the diagnosis(es)/condition(s) as documented were addressed during this visit. Due to the added complexity in care, I will continue to support Anni in the subsequent management and with ongoing continuity of care.    BMI  Estimated body mass index is 26.7 kg/m  as calculated from the following:    Height as of this encounter: 1.638 m (5' 4.5\").    Weight as of this encounter: 71.7 kg (158 lb).         Follow-up       Review of literature showed that further evaluation for tinnitus should not include a comprehensive audiologic examination.  This is partly important given the unilateral nature of tinnitus which can be associated with retrocochlear pathology or other significant underlying conditions.  Imaging studies such as MRI or CT of the temporal bone in cases of pulsatile tinnitus to rule out vascular abnormalities or other structural abnormalities.  If initial vascular imaging is negative endovascular etiology strongly suspected, digital subtraction angiography, DSA may further aid in the diagnosis.  MRI is more useful for neoplasm whereas CT scan for inner ear/semicircular canal.    Subjective   Anni is a 74 year old, " "presenting for the following health issues:  Tinnitus (Right ear intermittent pulsating sound x 1 month Denies drainage or pain in the ear)        5/6/2025    10:08 AM   Additional Questions   Roomed by Hui HUNT LPN     History of Present Illness       Reason for visit:  Right ear pulsing  Symptom onset:  More than a month  Symptoms include:  Heavy pulsing (I can hear my heartbeat) in my right ear when I do anything strenuous or when it's quiet and I'm in bed.  Symptom intensity:  Moderate  Symptom progression:  Staying the same  Had these symptoms before:  No  What makes it worse:  No  What makes it better:  No   She is taking medications regularly.        Patient Active Problem List   Diagnosis    Osteopenia    Hypercholesteremia    Hip osteoarthritis    Sensorineural hearing loss (SNHL) of left ear with unrestricted hearing of right ear    Tinnitus, left    Family history of factor V deficiency     Current Outpatient Medications   Medication Sig Dispense Refill    atorvastatin (LIPITOR) 10 MG tablet [ATORVASTATIN (LIPITOR) 10 MG TABLET] TAKE 1 TABLET(10 MG) BY MOUTH AT BEDTIME 90 tablet 3    cholecalciferol, vitamin D3, 1,000 unit tablet [CHOLECALCIFEROL, VITAMIN D3, 1,000 UNIT TABLET] Take 2,000 Units by mouth daily.      Turmeric 500 MG CAPS        No current facility-administered medications for this visit.           Review of Systems  Constitutional, neuro, ENT, endocrine, pulmonary, cardiac, gastrointestinal, genitourinary, musculoskeletal, integument and psychiatric systems are negative, except as otherwise noted.      Objective    /56   Pulse 60   Temp 98.2  F (36.8  C) (Oral)   Resp 20   Ht 1.638 m (5' 4.5\")   Wt 71.7 kg (158 lb)   SpO2 97%   BMI 26.70 kg/m    Body mass index is 26.7 kg/m .  Physical Exam   GENERAL: alert and no distress  EYES: Eyes grossly normal to inspection, PERRL and conjunctivae and sclerae normal  HENT: ear canals and TM's normal, nose and mouth without ulcers or " lesions  NECK: no adenopathy, no asymmetry, masses, or scars  RESP: lungs clear to auscultation - no rales, rhonchi or wheezes  CV: regular rate and rhythm, normal S1 S2, no S3 or S4, no murmur, click or rub, no peripheral edema  ABDOMEN: soft, nontender, no hepatosplenomegaly, no masses and bowel sounds normal  MS: no gross musculoskeletal defects noted, no edema            Signed Electronically by: Bisi Matute MD

## 2025-05-07 ENCOUNTER — PATIENT OUTREACH (OUTPATIENT)
Dept: CARE COORDINATION | Facility: CLINIC | Age: 75
End: 2025-05-07
Payer: MEDICARE

## 2025-05-20 ENCOUNTER — PATIENT OUTREACH (OUTPATIENT)
Dept: CARE COORDINATION | Facility: CLINIC | Age: 75
End: 2025-05-20
Payer: MEDICARE

## 2025-05-21 ENCOUNTER — DOCUMENTATION ONLY (OUTPATIENT)
Dept: OTHER | Facility: CLINIC | Age: 75
End: 2025-05-21
Payer: MEDICARE

## 2025-07-29 ENCOUNTER — MYC REFILL (OUTPATIENT)
Dept: FAMILY MEDICINE | Facility: CLINIC | Age: 75
End: 2025-07-29
Payer: MEDICARE

## 2025-07-29 DIAGNOSIS — E78.00 HYPERCHOLESTEREMIA: ICD-10-CM

## 2025-07-30 RX ORDER — ATORVASTATIN CALCIUM 10 MG/1
TABLET, FILM COATED ORAL
Qty: 90 TABLET | Refills: 0 | Status: SHIPPED | OUTPATIENT
Start: 2025-07-30

## 2025-08-21 SDOH — HEALTH STABILITY: PHYSICAL HEALTH: ON AVERAGE, HOW MANY DAYS PER WEEK DO YOU ENGAGE IN MODERATE TO STRENUOUS EXERCISE (LIKE A BRISK WALK)?: 5 DAYS

## 2025-08-21 SDOH — HEALTH STABILITY: PHYSICAL HEALTH: ON AVERAGE, HOW MANY MINUTES DO YOU ENGAGE IN EXERCISE AT THIS LEVEL?: 50 MIN

## 2025-08-21 ASSESSMENT — SOCIAL DETERMINANTS OF HEALTH (SDOH): HOW OFTEN DO YOU GET TOGETHER WITH FRIENDS OR RELATIVES?: MORE THAN THREE TIMES A WEEK

## 2025-08-26 ENCOUNTER — OFFICE VISIT (OUTPATIENT)
Dept: FAMILY MEDICINE | Facility: CLINIC | Age: 75
End: 2025-08-26
Payer: COMMERCIAL

## 2025-08-26 VITALS
SYSTOLIC BLOOD PRESSURE: 138 MMHG | RESPIRATION RATE: 16 BRPM | HEART RATE: 68 BPM | HEIGHT: 65 IN | OXYGEN SATURATION: 99 % | DIASTOLIC BLOOD PRESSURE: 64 MMHG | BODY MASS INDEX: 25.72 KG/M2 | WEIGHT: 154.4 LBS

## 2025-08-26 DIAGNOSIS — H90.42 SENSORINEURAL HEARING LOSS (SNHL) OF LEFT EAR WITH UNRESTRICTED HEARING OF RIGHT EAR: ICD-10-CM

## 2025-08-26 DIAGNOSIS — Z12.11 SCREEN FOR COLON CANCER: ICD-10-CM

## 2025-08-26 DIAGNOSIS — E78.00 HYPERCHOLESTEREMIA: ICD-10-CM

## 2025-08-26 DIAGNOSIS — H93.12 TINNITUS, LEFT: ICD-10-CM

## 2025-08-26 DIAGNOSIS — Z00.00 ENCOUNTER FOR MEDICARE ANNUAL WELLNESS EXAM: Primary | ICD-10-CM

## 2025-08-26 LAB
ALBUMIN SERPL BCG-MCNC: 4.6 G/DL (ref 3.5–5.2)
ALP SERPL-CCNC: 57 U/L (ref 40–150)
ALT SERPL W P-5'-P-CCNC: 16 U/L (ref 0–50)
ANION GAP SERPL CALCULATED.3IONS-SCNC: 14 MMOL/L (ref 7–15)
AST SERPL W P-5'-P-CCNC: 24 U/L (ref 0–45)
BASOPHILS # BLD AUTO: <0.04 10E3/UL (ref 0–0.2)
BASOPHILS NFR BLD AUTO: 0.7 %
BILIRUB SERPL-MCNC: 0.6 MG/DL
BUN SERPL-MCNC: 13.2 MG/DL (ref 8–23)
CALCIUM SERPL-MCNC: 9.8 MG/DL (ref 8.8–10.4)
CHLORIDE SERPL-SCNC: 102 MMOL/L (ref 98–107)
CHOLEST SERPL-MCNC: 169 MG/DL
CREAT SERPL-MCNC: 0.88 MG/DL (ref 0.51–0.95)
EGFRCR SERPLBLD CKD-EPI 2021: 69 ML/MIN/1.73M2
EOSINOPHIL # BLD AUTO: 0.11 10E3/UL (ref 0–0.7)
EOSINOPHIL NFR BLD AUTO: 2.5 %
ERYTHROCYTE [DISTWIDTH] IN BLOOD BY AUTOMATED COUNT: 12.1 % (ref 10–15)
FASTING STATUS PATIENT QL REPORTED: YES
FASTING STATUS PATIENT QL REPORTED: YES
GLUCOSE SERPL-MCNC: 97 MG/DL (ref 70–99)
HCO3 SERPL-SCNC: 25 MMOL/L (ref 22–29)
HCT VFR BLD AUTO: 40.1 % (ref 35–47)
HDLC SERPL-MCNC: 48 MG/DL
HGB BLD-MCNC: 13.8 G/DL (ref 11.7–15.7)
IMM GRANULOCYTES # BLD: <0.04 10E3/UL
IMM GRANULOCYTES NFR BLD: 0.2 %
LDLC SERPL CALC-MCNC: 103 MG/DL
LYMPHOCYTES # BLD AUTO: 1.29 10E3/UL (ref 0.8–5.3)
LYMPHOCYTES NFR BLD AUTO: 29.4 %
MCH RBC QN AUTO: 32.8 PG (ref 26.5–33)
MCHC RBC AUTO-ENTMCNC: 34.4 G/DL (ref 31.5–36.5)
MCV RBC AUTO: 95.2 FL (ref 78–100)
MONOCYTES # BLD AUTO: 0.53 10E3/UL (ref 0–1.3)
MONOCYTES NFR BLD AUTO: 12.1 %
NEUTROPHILS # BLD AUTO: 2.42 10E3/UL (ref 1.6–8.3)
NEUTROPHILS NFR BLD AUTO: 55.1 %
NONHDLC SERPL-MCNC: 121 MG/DL
PLATELET # BLD AUTO: 220 10E3/UL (ref 150–450)
POTASSIUM SERPL-SCNC: 4 MMOL/L (ref 3.4–5.3)
PROT SERPL-MCNC: 7.1 G/DL (ref 6.4–8.3)
RBC # BLD AUTO: 4.21 10E6/UL (ref 3.8–5.2)
SODIUM SERPL-SCNC: 141 MMOL/L (ref 135–145)
TRIGL SERPL-MCNC: 90 MG/DL
WBC # BLD AUTO: 4.39 10E3/UL (ref 4–11)

## 2025-08-26 RX ORDER — ATORVASTATIN CALCIUM 10 MG/1
TABLET, FILM COATED ORAL
Qty: 90 TABLET | Refills: 3 | Status: SHIPPED | OUTPATIENT
Start: 2025-08-26

## 2025-08-28 ENCOUNTER — TRANSFERRED RECORDS (OUTPATIENT)
Dept: HEALTH INFORMATION MANAGEMENT | Facility: CLINIC | Age: 75
End: 2025-08-28
Payer: MEDICARE